# Patient Record
Sex: MALE | Race: WHITE | NOT HISPANIC OR LATINO | Employment: OTHER | URBAN - METROPOLITAN AREA
[De-identification: names, ages, dates, MRNs, and addresses within clinical notes are randomized per-mention and may not be internally consistent; named-entity substitution may affect disease eponyms.]

---

## 2019-08-23 PROBLEM — R91.8 PULMONARY NODULES: Status: ACTIVE | Noted: 2019-08-23

## 2019-08-23 PROBLEM — R07.9 CHEST PAIN: Status: ACTIVE | Noted: 2019-08-23

## 2019-08-23 PROBLEM — D72.829 LEUKOCYTOSIS: Status: ACTIVE | Noted: 2019-08-23

## 2019-08-23 PROBLEM — R07.2 PRECORDIAL PAIN: Status: ACTIVE | Noted: 2019-08-23

## 2019-08-23 PROBLEM — F17.200 TOBACCO USE DISORDER: Status: ACTIVE | Noted: 2019-08-23

## 2019-09-05 PROBLEM — I25.10 CORONARY ARTERY DISEASE DUE TO CALCIFIED CORONARY LESION: Status: ACTIVE | Noted: 2019-09-05

## 2019-09-05 PROBLEM — I25.84 CORONARY ARTERY DISEASE DUE TO CALCIFIED CORONARY LESION: Status: ACTIVE | Noted: 2019-09-05

## 2019-09-05 PROBLEM — N40.1 BENIGN PROSTATIC HYPERPLASIA WITH URINARY FREQUENCY: Status: ACTIVE | Noted: 2019-09-05

## 2019-09-05 PROBLEM — R53.83 OTHER FATIGUE: Status: ACTIVE | Noted: 2019-09-05

## 2019-09-05 PROBLEM — E29.1 MALE HYPOGONADISM: Status: ACTIVE | Noted: 2019-09-05

## 2019-09-05 PROBLEM — R35.0 BENIGN PROSTATIC HYPERPLASIA WITH URINARY FREQUENCY: Status: ACTIVE | Noted: 2019-09-05

## 2019-09-05 PROBLEM — M51.9 LUMBAR DISC DISEASE: Status: ACTIVE | Noted: 2019-09-05

## 2019-09-12 ENCOUNTER — TELEPHONE (OUTPATIENT)
Dept: CARDIOLOGY | Facility: CLINIC | Age: 69
End: 2019-09-12

## 2019-09-12 NOTE — TELEPHONE ENCOUNTER
Spoke to pt. Wife, informed her of appointment has already been made. Pt. Verbalized understanding

## 2019-09-12 NOTE — TELEPHONE ENCOUNTER
----- Message from Kenji Riley sent at 9/12/2019  8:59 AM CDT -----  Contact: Federica - Spouse  Type: Needs Medical Advice    Who Called:  Federica  Best Call Back Number: [phone]  Additional Information: Caller states the patient was supposed to have received a call to schedule a 2 week follow up for Monday 9/9. Caller and patient has not received said call. No available appointments found on Epic. Please call to advise. Thanks!

## 2019-09-12 NOTE — TELEPHONE ENCOUNTER
----- Message from Nilam Andre sent at 9/12/2019  4:53 PM CDT -----  Contact: wife Federica  Patient wife need to speak to nurse regarding scheduling appt for patient for 2 week hospital f/u    Epic earliest is 01/28    Please call to advice [phone] (home)

## 2019-10-01 ENCOUNTER — OFFICE VISIT (OUTPATIENT)
Dept: CARDIOLOGY | Facility: CLINIC | Age: 69
End: 2019-10-01
Payer: MEDICARE

## 2019-10-01 VITALS
HEART RATE: 69 BPM | BODY MASS INDEX: 19.32 KG/M2 | SYSTOLIC BLOOD PRESSURE: 111 MMHG | HEIGHT: 71 IN | DIASTOLIC BLOOD PRESSURE: 69 MMHG | WEIGHT: 138 LBS

## 2019-10-01 DIAGNOSIS — I25.84 CORONARY ARTERY DISEASE DUE TO CALCIFIED CORONARY LESION: ICD-10-CM

## 2019-10-01 DIAGNOSIS — F17.200 TOBACCO USE DISORDER: ICD-10-CM

## 2019-10-01 DIAGNOSIS — I25.10 CORONARY ARTERY DISEASE DUE TO CALCIFIED CORONARY LESION: ICD-10-CM

## 2019-10-01 PROCEDURE — 99214 OFFICE O/P EST MOD 30 MIN: CPT | Mod: S$PBB,,, | Performed by: PHYSICIAN ASSISTANT

## 2019-10-01 PROCEDURE — 99214 PR OFFICE/OUTPT VISIT, EST, LEVL IV, 30-39 MIN: ICD-10-PCS | Mod: S$PBB,,, | Performed by: PHYSICIAN ASSISTANT

## 2019-10-01 PROCEDURE — 99214 OFFICE O/P EST MOD 30 MIN: CPT | Mod: PBBFAC,PO | Performed by: PHYSICIAN ASSISTANT

## 2019-10-01 PROCEDURE — 99999 PR PBB SHADOW E&M-EST. PATIENT-LVL IV: ICD-10-PCS | Mod: PBBFAC,,, | Performed by: PHYSICIAN ASSISTANT

## 2019-10-01 PROCEDURE — 99999 PR PBB SHADOW E&M-EST. PATIENT-LVL IV: CPT | Mod: PBBFAC,,, | Performed by: PHYSICIAN ASSISTANT

## 2019-10-01 NOTE — PROGRESS NOTES
Subjective:    Patient ID:  Nas Jenkins is a 68 y.o. male who presents for follow-up of CAD.    HPI  Mr. Jenkins is a very pleasant gentleman who follows with Dr. Tate. He was hospitalized three times in August with generalized weakness. He had an extensive evaluation which included a nuclear stress test that was negative for ischemia. Because of his continue complaints of generalized weakness and fatigue he underwent coronary angiography which revealed a normal LM, significant disease in the LAD, mild disease in the LCx, and a  of the RCA. He underwent successful stenting of the LAD with LEXY. It was recommended, if he had continued symptoms, to return for PCI of the RCA.     Since returning home, he continues to have episodes of tremors with LE weakness. He is scheduled to follow up with Neurology with an MRI next week. He denies symptoms of CP or BARROSO. He has decreased exercise tolerance, but attributes this to debility as he has been unable to do much for a few months secondary to his tremors and LE weakness.     Review of Systems   Constitution: Positive for decreased appetite, malaise/fatigue and weight loss. Negative for chills, diaphoresis, fever and weight gain.   HENT: Negative for sore throat.    Eyes: Negative for blurred vision, vision loss in left eye, vision loss in right eye and visual disturbance.   Cardiovascular: Negative for chest pain, claudication, dyspnea on exertion, leg swelling, near-syncope, orthopnea, palpitations, paroxysmal nocturnal dyspnea and syncope.   Respiratory: Negative for cough, hemoptysis, shortness of breath, sputum production and wheezing.    Endocrine: Negative for cold intolerance and heat intolerance.   Hematologic/Lymphatic: Negative for adenopathy. Does not bruise/bleed easily.   Skin: Negative for rash.   Musculoskeletal: Positive for joint pain (right hip) and muscle weakness. Negative for falls and myalgias.   Gastrointestinal: Negative for abdominal pain, change  "in bowel habit, constipation, diarrhea, melena and nausea.   Genitourinary: Negative for bladder incontinence.   Neurological: Positive for numbness (R LE, now resolved), tremors and weakness. Negative for dizziness, focal weakness, headaches and light-headedness.   Psychiatric/Behavioral: Negative for altered mental status.         Vitals:    10/01/19 1409   BP: 111/69   BP Location: Left arm   Patient Position: Sitting   BP Method: Medium (Automatic)   Pulse: 69   Weight: 62.6 kg (138 lb 0.1 oz)   Height: 5' 11" (1.803 m)   Body mass index is 19.25 kg/m².     Objective:    Physical Exam   Constitutional: He is oriented to person, place, and time. He appears well-developed and well-nourished.   HENT:   Head: Normocephalic and atraumatic.   Eyes: Pupils are equal, round, and reactive to light. EOM are normal.   Neck: Neck supple. No JVD present. No tracheal deviation present. No thyromegaly present.   Cardiovascular: Normal rate, regular rhythm, S1 normal, S2 normal, intact distal pulses and normal pulses. PMI is not displaced. Exam reveals no gallop and no friction rub.   No murmur heard.  Pulmonary/Chest: Effort normal and breath sounds normal. No respiratory distress. He has no wheezes. He has no rales. He exhibits no tenderness.   Abdominal: Soft. Bowel sounds are normal. He exhibits no distension and no mass. There is no tenderness.   Musculoskeletal: Normal range of motion. He exhibits no edema or tenderness.   Neurological: He is alert and oriented to person, place, and time.   Skin: Skin is warm and dry. No rash noted.   Psychiatric: He has a normal mood and affect. His behavior is normal.         Assessment:         Coronary artery disease due to calcified coronary lesion  S/p LAD PCI on 8/25/19.  On ASA, Plavix, and statin.     Tobacco use disorder  Continues to smoke.     Plan:       Continue current cardiac medications.   Discussed diet and exercise.   F/U with Neurology as scheduled.   He is currently " asymptomatic with regards to his CAD. Will defer further intervention at this time. Patient will contact us if he develops symptoms.   F/U with Dr. Tate in 6 months.

## 2019-12-09 PROBLEM — I25.10 CORONARY ARTERY DISEASE INVOLVING NATIVE CORONARY ARTERY OF NATIVE HEART WITHOUT ANGINA PECTORIS: Status: ACTIVE | Noted: 2019-12-09

## 2019-12-09 PROBLEM — M48.02 SPINAL STENOSIS IN CERVICAL REGION: Status: ACTIVE | Noted: 2019-10-11

## 2019-12-09 PROBLEM — R25.1 TREMOR: Status: ACTIVE | Noted: 2019-10-11

## 2020-03-30 ENCOUNTER — PATIENT MESSAGE (OUTPATIENT)
Dept: CARDIOLOGY | Facility: CLINIC | Age: 70
End: 2020-03-30

## 2020-03-30 ENCOUNTER — TELEPHONE (OUTPATIENT)
Dept: CARDIOLOGY | Facility: CLINIC | Age: 70
End: 2020-03-30

## 2020-03-30 NOTE — TELEPHONE ENCOUNTER
Switched appt to virtual visit and sent pt portal message.     ----- Message from Sweetie Brown sent at 3/30/2020  1:23 PM CDT -----  Type: Needs Medical Advice    Who Called: Federica (wife)  Best Call Back Number: [phone]  Additional Information: patient portal has been set up. Requesting a call back to schedule virtual visit.

## 2020-04-06 PROBLEM — E78.5 DYSLIPIDEMIA: Status: ACTIVE | Noted: 2020-04-06

## 2020-04-06 PROBLEM — Z95.5 STENTED CORONARY ARTERY: Status: ACTIVE | Noted: 2020-04-06

## 2020-04-17 PROBLEM — I73.9 PAD (PERIPHERAL ARTERY DISEASE): Status: ACTIVE | Noted: 2020-04-17

## 2020-05-06 ENCOUNTER — TELEPHONE (OUTPATIENT)
Dept: CARDIOLOGY | Facility: CLINIC | Age: 70
End: 2020-05-06

## 2020-05-06 NOTE — TELEPHONE ENCOUNTER
----- Message from Nikky Egnland sent at 5/6/2020  1:34 PM CDT -----  Contact: Wife/Roxann  Type: Needs Medical Advice  Who Called:  Wife  Best Call Back Number: [phone]  Additional Information: Roxann states she has just taken COVID-19 test and will take some time to get results back.  Roxann would like patient to see in office but will call to  Update when she will be able to make apt.  Please call to advise. Thanks!

## 2020-05-12 ENCOUNTER — PATIENT MESSAGE (OUTPATIENT)
Dept: CARDIOLOGY | Facility: CLINIC | Age: 70
End: 2020-05-12

## 2020-07-13 ENCOUNTER — TELEPHONE (OUTPATIENT)
Dept: CARDIOLOGY | Facility: CLINIC | Age: 70
End: 2020-07-13

## 2020-07-13 NOTE — TELEPHONE ENCOUNTER
----- Message from Summer Braun sent at 7/13/2020 11:37 AM CDT -----  Pt called to get an appointment scheduled to get surgery clearance. Pt surgery is scheduled for 8/1/2020 clearance needs to be done before then.  please reach out to pt wife at [phone]

## 2020-07-15 ENCOUNTER — TELEPHONE (OUTPATIENT)
Dept: CARDIOLOGY | Facility: CLINIC | Age: 70
End: 2020-07-15

## 2020-07-15 NOTE — TELEPHONE ENCOUNTER
Pt is scheduled for left fem pop with vein harvest on 8/10 under general anesthesia. Pt is on plavix and asa. Please advise.

## 2020-07-16 ENCOUNTER — OFFICE VISIT (OUTPATIENT)
Dept: CARDIOLOGY | Facility: CLINIC | Age: 70
End: 2020-07-16
Payer: MEDICARE

## 2020-07-16 VITALS
BODY MASS INDEX: 20.51 KG/M2 | SYSTOLIC BLOOD PRESSURE: 113 MMHG | HEART RATE: 80 BPM | DIASTOLIC BLOOD PRESSURE: 77 MMHG | WEIGHT: 151.44 LBS | HEIGHT: 72 IN

## 2020-07-16 DIAGNOSIS — E78.5 DYSLIPIDEMIA: ICD-10-CM

## 2020-07-16 DIAGNOSIS — I73.9 PAD (PERIPHERAL ARTERY DISEASE): ICD-10-CM

## 2020-07-16 DIAGNOSIS — Z95.5 STENTED CORONARY ARTERY: ICD-10-CM

## 2020-07-16 DIAGNOSIS — I25.10 CORONARY ARTERY DISEASE INVOLVING NATIVE CORONARY ARTERY OF NATIVE HEART WITHOUT ANGINA PECTORIS: Primary | ICD-10-CM

## 2020-07-16 PROCEDURE — 99214 PR OFFICE/OUTPT VISIT, EST, LEVL IV, 30-39 MIN: ICD-10-PCS | Mod: S$GLB,,, | Performed by: INTERNAL MEDICINE

## 2020-07-16 PROCEDURE — 99999 PR PBB SHADOW E&M-EST. PATIENT-LVL IV: ICD-10-PCS | Mod: PBBFAC,,, | Performed by: INTERNAL MEDICINE

## 2020-07-16 PROCEDURE — 99214 OFFICE O/P EST MOD 30 MIN: CPT | Mod: S$GLB,,, | Performed by: INTERNAL MEDICINE

## 2020-07-16 PROCEDURE — 99214 OFFICE O/P EST MOD 30 MIN: CPT | Mod: PBBFAC,PO | Performed by: INTERNAL MEDICINE

## 2020-07-16 PROCEDURE — 99999 PR PBB SHADOW E&M-EST. PATIENT-LVL IV: CPT | Mod: PBBFAC,,, | Performed by: INTERNAL MEDICINE

## 2020-07-16 NOTE — PROGRESS NOTES
Subjective:    Patient ID:  Nas Jenkins is a 69 y.o. male who presents for follow-up of Cardiac Clearance      HPI  Here for follow up of CAD-PCI (8/19 ).  Has left CFA stenosis scheduled for surgery Dr santos. Patients states is doing well no chest pain, SOB or change in exertional tolerence.         Review of Systems   Constitution: Negative for malaise/fatigue.   Eyes: Negative for blurred vision.   Cardiovascular: Negative for chest pain, claudication, cyanosis, dyspnea on exertion, irregular heartbeat, leg swelling, near-syncope, orthopnea, palpitations, paroxysmal nocturnal dyspnea and syncope.   Respiratory: Negative for cough and shortness of breath.    Hematologic/Lymphatic: Does not bruise/bleed easily.   Musculoskeletal: Negative for back pain, falls, joint pain, muscle cramps, muscle weakness and myalgias.   Gastrointestinal: Negative for abdominal pain, change in bowel habit, nausea and vomiting.   Genitourinary: Negative for urgency.   Neurological: Negative for dizziness, focal weakness and light-headedness.       Past Medical History:   Diagnosis Date    BPH (benign prostatic hyperplasia)     Carpal tunnel syndrome     Coronary artery disease     Fibromyalgia     Fractures     GERD (gastroesophageal reflux disease)     Hyperlipidemia     Lumbar disc disease     Male hypogonadism     PAD (peripheral artery disease)     PTSD (post-traumatic stress disorder)      Patient Active Problem List   Diagnosis    Precordial pain    Tobacco use disorder    Pulmonary nodules    Leukocytosis    Dyspnea    Other fatigue    Coronary artery disease due to calcified coronary lesion    Lumbar disc disease    Male hypogonadism    Benign prostatic hyperplasia with urinary frequency    Spinal stenosis in cervical region    Tremor    Coronary artery disease involving native coronary artery of native heart without angina pectoris    Stented coronary artery    Dyslipidemia    PAD (peripheral  artery disease)        Objective:     Vitals:    07/16/20 1450   BP: 113/77   Pulse: 80        Physical Exam   Constitutional: He is oriented to person, place, and time. He appears well-developed and well-nourished.   HENT:   Head: Normocephalic and atraumatic.   Eyes: Pupils are equal, round, and reactive to light. EOM are normal.   Neck: Neck supple. No JVD present. No tracheal deviation present. No thyromegaly present.   Cardiovascular: Normal rate, regular rhythm, S1 normal, S2 normal, intact distal pulses and normal pulses. PMI is not displaced. Exam reveals no gallop and no friction rub.   No murmur heard.  Pulmonary/Chest: Effort normal and breath sounds normal. No respiratory distress. He has no wheezes. He has no rales. He exhibits no tenderness.   Abdominal: Soft. Bowel sounds are normal. He exhibits no distension and no mass. There is no abdominal tenderness.   Musculoskeletal: Normal range of motion.         General: No tenderness or edema.   Neurological: He is alert and oriented to person, place, and time.   Skin: Skin is warm and dry. No rash noted.   Psychiatric: He has a normal mood and affect. His behavior is normal.             ..    Chemistry        Component Value Date/Time     06/17/2020 1445    K 4.1 06/17/2020 1445     06/17/2020 1445    CO2 30 06/17/2020 1445    BUN 13 06/17/2020 1445    CREATININE 1.00 06/17/2020 1445    GLU 94 06/17/2020 1445        Component Value Date/Time    CALCIUM 9.2 06/17/2020 1445    ALKPHOS 85 06/17/2020 1445    AST 37 06/17/2020 1445    ALT 27 06/17/2020 1445    BILITOT 0.3 06/17/2020 1445    ESTGFRAFRICA >60 06/17/2020 1445    EGFRNONAA >60 06/17/2020 1445            ..  Lab Results   Component Value Date    CHOL 162 06/17/2020    CHOL 141 12/09/2019     Lab Results   Component Value Date    HDL 47 06/17/2020    HDL 51 12/09/2019     Lab Results   Component Value Date    LDLCALC 52.8 (L) 06/17/2020    LDLCALC 49.8 (L) 12/09/2019     Lab Results    Component Value Date    TRIG 311 (H) 06/17/2020    TRIG 201 (H) 12/09/2019     Lab Results   Component Value Date    CHOLHDL 29.0 06/17/2020    CHOLHDL 36.2 12/09/2019     ..  Lab Results   Component Value Date    WBC 8.68 06/17/2020    HGB 14.0 06/17/2020    HCT 44.8 06/17/2020    MCV 99 (H) 06/17/2020     06/17/2020       Test(s) Reviewed  I have reviewed the following in detail:  [] Stress test   [] Angiography   [x] Echocardiogram   [x] Labs   [x] Other:       Assessment:         ICD-10-CM ICD-9-CM   1. Coronary artery disease involving native coronary artery of native heart without angina pectoris  I25.10 414.01   2. Stented coronary artery  Z95.5 V45.82   3. Dyslipidemia  E78.5 272.4   4. PAD (peripheral artery disease)  I73.9 443.9     Problem List Items Addressed This Visit     Coronary artery disease involving native coronary artery of native heart without angina pectoris - Primary    Overview     8/19  Left main- normal in appearance for patient's age.  Left anterior descending-70% lesion seen is midportion mid LAD.  There are 2 medium-sized diagonal diffuse mild disease all less than 25%.    Circumflex- all less than 15 at 20%.  There is 3 obtuse marginals all with mild disease all less than 25%.  Right coronary artery-chronic total occlusion in its proximal portion.  The distal right coronary is a normal size vessel seen filling via collaterals from the distal circumflex and septal perforators from the LAD.     Fractional flow reserve of the LAD was 0.79.  Successful stenting of the LAD using a Synergy 3.0 x 28 post dilated to 3.0    I          Stented coronary artery    Overview     8/19   LAD using a Synergy 3.0 x 28 post dilated to 3.0             Dyslipidemia    PAD (peripheral artery disease)           Plan:           Return to clinic 9 months   Low level/low impact aerobic exercise 5x's/wk. Heart healthy diet and risk factor modification.    See labs and med orders.  Patient is a low CV  risk for a low risk surgery. Continue BP/antiplatelet meds ángela-op.  Start fish oil repeat trig if still elevated start fibrates.    Portions of this note may have been created with voice recognition software.  Grammatical, syntax and spelling errors may be inevitable.

## 2020-07-24 ENCOUNTER — TELEPHONE (OUTPATIENT)
Dept: CARDIOLOGY | Facility: CLINIC | Age: 70
End: 2020-07-24

## 2020-07-24 NOTE — TELEPHONE ENCOUNTER
----- Message from Marlys Lugo sent at 7/24/2020  2:51 PM CDT -----  Regarding: for f/u appt after surgery  Contact: Federica wife  Type: Needs Medical Advice  Who Called:  Ignacia whatley  Best Call Back Number: [phone]  Additional Information: Pls call wife regarding post op visit plus she has several questions to ask

## 2020-11-13 PROBLEM — H25.9 AGE-RELATED CATARACT: Status: ACTIVE | Noted: 2020-11-13

## 2020-11-13 PROBLEM — D50.9 IRON DEFICIENCY ANEMIA: Status: ACTIVE | Noted: 2020-11-13

## 2020-11-13 PROBLEM — J34.2 DEVIATED NASAL SEPTUM: Status: ACTIVE | Noted: 2020-11-13

## 2020-11-13 PROBLEM — K29.90 GASTRODUODENITIS: Status: ACTIVE | Noted: 2020-11-13

## 2020-11-13 PROBLEM — G56.00 CARPAL TUNNEL SYNDROME: Status: ACTIVE | Noted: 2020-11-13

## 2020-11-13 PROBLEM — F45.42 OTHER PAIN DISORDERS RELATED TO PSYCHOLOGICAL FACTORS: Status: ACTIVE | Noted: 2020-11-13

## 2020-11-13 PROBLEM — F43.21 ADJUSTMENT DISORDER WITH DEPRESSED MOOD: Status: ACTIVE | Noted: 2020-11-13

## 2020-11-13 PROBLEM — H90.3 BILATERAL SENSORINEURAL HEARING LOSS: Status: ACTIVE | Noted: 2020-11-13

## 2020-11-13 PROBLEM — E78.1 PURE HYPERTRIGLYCERIDEMIA: Status: ACTIVE | Noted: 2020-11-13

## 2020-11-13 PROBLEM — M79.7 RHEUMATISM AND FIBROSITIS: Status: ACTIVE | Noted: 2020-11-13

## 2020-11-13 PROBLEM — M79.0 RHEUMATISM AND FIBROSITIS: Status: ACTIVE | Noted: 2020-11-13

## 2020-11-13 PROBLEM — K21.9 GASTROESOPHAGEAL REFLUX DISEASE WITHOUT ESOPHAGITIS: Status: ACTIVE | Noted: 2020-11-13

## 2021-07-01 PROBLEM — K31.84 GASTROPARESIS: Status: ACTIVE | Noted: 2021-07-01

## 2021-07-01 PROBLEM — M54.9 MID BACK PAIN: Status: ACTIVE | Noted: 2021-07-01

## 2022-01-13 ENCOUNTER — TELEPHONE (OUTPATIENT)
Dept: CARDIOLOGY | Facility: CLINIC | Age: 72
End: 2022-01-13
Payer: OTHER GOVERNMENT

## 2022-01-13 NOTE — TELEPHONE ENCOUNTER
----- Message from Waqar Varma LPN sent at 1/13/2022  4:59 PM CST -----  Contact: pt wife    ----- Message -----  From: Raquel Guerra  Sent: 1/13/2022   4:59 PM CST  To: Lea FARLEY Staff    Type:  RX Refill Request    Who Called:  pt wife  Refill or New Rx:  refill  RX Name and Strength:  HYDROcodone-acetaminophen (NORCO) 7.5-325 mg per tablet  How is the patient currently taking it? (ex. 1XDay):  as directed  Is this a 30 day or 90 day RX:  30  Preferred Pharmacy with phone number:      [facility]/pharmacy #[id number] [address] [address]  Phone: [phone] Fax: [phone]        Local or Mail Order:  local  Ordering Provider:  historical dr vitaliy Zapien Call Back Number:  [phone]  Additional Information:  pt is completely out of medication.  Currently searching for new PCP and dr pizano is only provider at [facility] at the moment.  Wife requests that dr consider refilling RX this one month until she is able to est care with another PCP.  Thank you~

## 2022-01-13 NOTE — TELEPHONE ENCOUNTER
Advised pt's wife that we do not fill this medication in cardiology. Advised to call to schedule appt with primary care asap.

## 2022-01-19 ENCOUNTER — OFFICE VISIT (OUTPATIENT)
Dept: FAMILY MEDICINE | Facility: CLINIC | Age: 72
End: 2022-01-19
Payer: MEDICARE

## 2022-01-19 VITALS
HEART RATE: 80 BPM | OXYGEN SATURATION: 97 % | TEMPERATURE: 98 F | WEIGHT: 158.75 LBS | BODY MASS INDEX: 21.5 KG/M2 | DIASTOLIC BLOOD PRESSURE: 64 MMHG | SYSTOLIC BLOOD PRESSURE: 118 MMHG | HEIGHT: 72 IN

## 2022-01-19 DIAGNOSIS — I25.10 CORONARY ARTERY DISEASE DUE TO CALCIFIED CORONARY LESION: ICD-10-CM

## 2022-01-19 DIAGNOSIS — Z01.818 PRE-OP EVALUATION: Primary | ICD-10-CM

## 2022-01-19 DIAGNOSIS — K40.90 RIGHT INGUINAL HERNIA: ICD-10-CM

## 2022-01-19 DIAGNOSIS — M51.9 LUMBAR DISC DISEASE: ICD-10-CM

## 2022-01-19 DIAGNOSIS — I73.9 PAD (PERIPHERAL ARTERY DISEASE): ICD-10-CM

## 2022-01-19 DIAGNOSIS — I25.84 CORONARY ARTERY DISEASE DUE TO CALCIFIED CORONARY LESION: ICD-10-CM

## 2022-01-19 DIAGNOSIS — F43.21 ADJUSTMENT DISORDER WITH DEPRESSED MOOD: ICD-10-CM

## 2022-01-19 DIAGNOSIS — F17.200 TOBACCO USE DISORDER: ICD-10-CM

## 2022-01-19 PROCEDURE — 99999 PR PBB SHADOW E&M-EST. PATIENT-LVL V: CPT | Mod: PBBFAC,,, | Performed by: FAMILY MEDICINE

## 2022-01-19 PROCEDURE — 1159F MED LIST DOCD IN RCRD: CPT | Mod: CPTII,S$GLB,, | Performed by: FAMILY MEDICINE

## 2022-01-19 PROCEDURE — 99999 PR PBB SHADOW E&M-EST. PATIENT-LVL V: ICD-10-PCS | Mod: PBBFAC,,, | Performed by: FAMILY MEDICINE

## 2022-01-19 PROCEDURE — 99204 OFFICE O/P NEW MOD 45 MIN: CPT | Mod: S$GLB,,, | Performed by: FAMILY MEDICINE

## 2022-01-19 PROCEDURE — 99204 PR OFFICE/OUTPT VISIT, NEW, LEVL IV, 45-59 MIN: ICD-10-PCS | Mod: S$GLB,,, | Performed by: FAMILY MEDICINE

## 2022-01-19 PROCEDURE — 1125F PR PAIN SEVERITY QUANTIFIED, PAIN PRESENT: ICD-10-PCS | Mod: CPTII,S$GLB,, | Performed by: FAMILY MEDICINE

## 2022-01-19 PROCEDURE — 3078F DIAST BP <80 MM HG: CPT | Mod: CPTII,S$GLB,, | Performed by: FAMILY MEDICINE

## 2022-01-19 PROCEDURE — 3074F PR MOST RECENT SYSTOLIC BLOOD PRESSURE < 130 MM HG: ICD-10-PCS | Mod: CPTII,S$GLB,, | Performed by: FAMILY MEDICINE

## 2022-01-19 PROCEDURE — 3008F BODY MASS INDEX DOCD: CPT | Mod: CPTII,S$GLB,, | Performed by: FAMILY MEDICINE

## 2022-01-19 PROCEDURE — 1160F PR REVIEW ALL MEDS BY PRESCRIBER/CLIN PHARMACIST DOCUMENTED: ICD-10-PCS | Mod: CPTII,S$GLB,, | Performed by: FAMILY MEDICINE

## 2022-01-19 PROCEDURE — 3008F PR BODY MASS INDEX (BMI) DOCUMENTED: ICD-10-PCS | Mod: CPTII,S$GLB,, | Performed by: FAMILY MEDICINE

## 2022-01-19 PROCEDURE — 3078F PR MOST RECENT DIASTOLIC BLOOD PRESSURE < 80 MM HG: ICD-10-PCS | Mod: CPTII,S$GLB,, | Performed by: FAMILY MEDICINE

## 2022-01-19 PROCEDURE — 1125F AMNT PAIN NOTED PAIN PRSNT: CPT | Mod: CPTII,S$GLB,, | Performed by: FAMILY MEDICINE

## 2022-01-19 PROCEDURE — 1160F RVW MEDS BY RX/DR IN RCRD: CPT | Mod: CPTII,S$GLB,, | Performed by: FAMILY MEDICINE

## 2022-01-19 PROCEDURE — 1159F PR MEDICATION LIST DOCUMENTED IN MEDICAL RECORD: ICD-10-PCS | Mod: CPTII,S$GLB,, | Performed by: FAMILY MEDICINE

## 2022-01-19 PROCEDURE — 3074F SYST BP LT 130 MM HG: CPT | Mod: CPTII,S$GLB,, | Performed by: FAMILY MEDICINE

## 2022-01-19 RX ORDER — HYDROCODONE BITARTRATE AND ACETAMINOPHEN 7.5; 325 MG/1; MG/1
1 TABLET ORAL EVERY 8 HOURS PRN
Qty: 30 TABLET | Refills: 0 | Status: SHIPPED | OUTPATIENT
Start: 2022-01-19 | End: 2022-01-28 | Stop reason: SDUPTHER

## 2022-01-19 NOTE — PROGRESS NOTES
Subjective:       Patient ID: Nas Jenkins is a 71 y.o. male.    Chief Complaint: Establish Care and Medication Refill    Here today to establish care with a new PCP.  He was seeing Dr. Bro Rdz who recently retired. [facility]   He has a PMH of chronic low back pain, fibromyalgia, GERD, hyperlipidemia, CAD, PTSD, and PAD.  He has been getting Norco 10 #120 per month from his PCP.  He is requesting a refill on the medication.  checked, medication last filled on 12/13/21.    CAD:  He is seeing Dr. Tate (Cardiology), he has stents in his coronary arteries.  He is on ASA, Plavix and Crestor.  Inguinal Hernia:  Recently saw general surgeon (Kofi) who plans to do surgery once he gets clearance.  He has an appt with his Cardiologist later this month.  He last had lab work in July.    Medication Refill  Pertinent negatives include no abdominal pain, chest pain, coughing, fever, headaches, nausea, vomiting or weakness.     Review of Systems   Constitutional: Negative for appetite change and fever.   Respiratory: Negative for cough, shortness of breath and wheezing.    Cardiovascular: Negative for chest pain and palpitations.   Gastrointestinal: Negative for abdominal pain, constipation, diarrhea, nausea and vomiting.   Genitourinary: Negative for difficulty urinating, dysuria, frequency and hematuria.   Neurological: Negative for dizziness, syncope, weakness and headaches.   Psychiatric/Behavioral: Negative for agitation, behavioral problems and confusion. The patient is not nervous/anxious.        Objective:      Vitals:    01/19/22 1015   BP: 118/64   BP Location: Right arm   Patient Position: Sitting   BP Method: Large (Manual)   Pulse: 80   Temp: 98.3 °F (36.8 °C)   SpO2: 97%   Weight: 72 kg (158 lb 11.7 oz)   Height: 6' (1.829 m)      Physical Exam    Assessment:       1. Pre-op evaluation    2. Right inguinal hernia    3. Coronary artery disease due to calcified coronary lesion    4.  PAD (peripheral artery disease)    5. Lumbar disc disease    6. Adjustment disorder with depressed mood    7. Tobacco use disorder        Plan:       Pre-op evaluation  -     CBC Auto Differential; Future; Expected date: 01/19/2022  -     Comprehensive Metabolic Panel; Future; Expected date: 01/19/2022    Right inguinal hernia    Coronary artery disease due to calcified coronary lesion    PAD (peripheral artery disease)    Lumbar disc disease  -     Ambulatory referral/consult to Pain Clinic; Future; Expected date: 01/26/2022  -     HYDROcodone-acetaminophen (NORCO) 7.5-325 mg per tablet; Take 1 tablet by mouth every 8 (eight) hours as needed for Pain. Greater than seven days therapy is medically necessary  Dispense: 30 tablet; Refill: 0    Adjustment disorder with depressed mood  -     Ambulatory referral/consult to Psychiatry; Future; Expected date: 01/26/2022    Tobacco use disorder    I explained to patient that I do not write chronic narcotics or chronic benzos.  I am willing to refer him to pain mgt and psych at this time.  I will fill a small amount of the medication as he has been out for a couple days and has gone through withdrawals in the past.  Otherwise, pending normal lab work, he is clear for surgery from my perspective.      Medication List with Changes/Refills   Current Medications    ALBUTEROL (VENTOLIN HFA) 90 MCG/ACTUATION INHALER    Inhale 2 puffs into the lungs every 4 (four) hours as needed for Wheezing. Rescue    ASPIRIN (ECOTRIN) 81 MG EC TABLET    Take 81 mg by mouth once daily. Only takes every now and then    AZELASTINE (ASTELIN) 137 MCG (0.1 %) NASAL SPRAY    SPRAY 1 SPRAY INTO EACH NOSTRIL TWICE DAILY    CETIRIZINE (ZYRTEC) 10 MG TABLET    Take 10 mg by mouth once daily.    CLONAZEPAM (KLONOPIN) 1 MG TABLET    Take 1 tablet (1 mg total) by mouth 2 (two) times daily as needed for Anxiety.    CLOPIDOGREL (PLAVIX) 75 MG TABLET    Take 1 tablet (75 mg total) by mouth once daily.    COENZYME  Q10 200 MG CAPSULE    Take 200 mg by mouth once daily.    DULOXETINE (CYMBALTA) 60 MG CAPSULE    Take 1 capsule (60 mg total) by mouth 2 (two) times daily.    EYEBRIGHT ORAL    Take 1 capsule by mouth once daily.    FERROUS SULFATE (FEOSOL) 325 MG (65 MG IRON) TAB TABLET    Take 325 mg by mouth once daily.    FISH OIL-OMEGA-3 FATTY ACIDS 300-1,000 MG CAPSULE    Take 1 capsule by mouth once daily.    LACTOBACILLUS RHAMNOSUS GG (CULTURELLE) 10 BILLION CELL CAPSULE    Take 1 capsule by mouth once daily.    MAG CARB/ALUMINUM HYDROX/ALGIN (GAVISCON EXTRA STRENGTH ORAL)    Take 1 tablet by mouth 4 (four) times daily as needed (for heartburn).    METOCLOPRAMIDE HCL (REGLAN) 5 MG TABLET    Take 1 tablet (5 mg total) by mouth 3 (three) times daily before meals.    MULTIVITAMIN WITH MINERALS (NENITA MULTIPLE/CHELATED MINERAL) TABLET    Take 1 tablet by mouth once daily.     NITROGLYCERIN (NITROSTAT) 0.4 MG SL TABLET    Place 1 tablet (0.4 mg total) under the tongue every 5 (five) minutes as needed for Chest pain.    PROMETHAZINE (PHENERGAN) 25 MG TABLET    Take 25 mg by mouth every 6 (six) hours as needed.     ROSUVASTATIN (CRESTOR) 40 MG TAB    TAKE 1 TABLET BY MOUTH EVERY DAY    SILDENAFIL (VIAGRA) 100 MG TABLET    Take 1 tablet (100 mg total) by mouth daily as needed for Erectile Dysfunction.    TAMSULOSIN (FLOMAX) 0.4 MG CAP    Take 1 capsule (0.4 mg total) by mouth once daily.    TESTOSTERONE (ANDROGEL) 1.62 % (20.25 MG/1.25 GRAM) GLPK    Place 2.5 g onto the skin once daily.    TURMERIC ORAL    Take 1 capsule by mouth once daily.    VITAMIN D (VITAMIN D3) 1000 UNITS TAB    Take 1,000 Units by mouth once daily.   Changed and/or Refilled Medications    Modified Medication Previous Medication    HYDROCODONE-ACETAMINOPHEN (NORCO) 7.5-325 MG PER TABLET HYDROcodone-acetaminophen (NORCO) 7.5-325 mg per tablet       Take 1 tablet by mouth every 8 (eight) hours as needed for Pain. Greater than seven days therapy is medically  necessary    Take 1 tablet by mouth every 8 (eight) hours as needed for Pain. Greater than seven days therapy is medically necessary   Discontinued Medications    FAMOTIDINE (PEPCID) 40 MG TABLET    Take 1 tablet (40 mg total) by mouth 2 (two) times daily.    IPRATROPIUM (ATROVENT) 0.03 % NASAL SPRAY    2 sprays by Nasal route 2 (two) times daily.    PREDNISONE (DELTASONE) 10 MG TABLET    Take 1 tablet (10 mg total) by mouth once daily.

## 2022-01-25 ENCOUNTER — OFFICE VISIT (OUTPATIENT)
Dept: CARDIOLOGY | Facility: CLINIC | Age: 72
End: 2022-01-25
Payer: OTHER GOVERNMENT

## 2022-01-25 VITALS
DIASTOLIC BLOOD PRESSURE: 79 MMHG | SYSTOLIC BLOOD PRESSURE: 138 MMHG | HEIGHT: 72 IN | WEIGHT: 160.5 LBS | BODY MASS INDEX: 21.74 KG/M2 | HEART RATE: 67 BPM

## 2022-01-25 DIAGNOSIS — I73.9 PAD (PERIPHERAL ARTERY DISEASE): ICD-10-CM

## 2022-01-25 DIAGNOSIS — R06.09 DYSPNEA ON EXERTION: ICD-10-CM

## 2022-01-25 DIAGNOSIS — I25.10 CORONARY ARTERY DISEASE INVOLVING NATIVE CORONARY ARTERY OF NATIVE HEART WITHOUT ANGINA PECTORIS: Primary | ICD-10-CM

## 2022-01-25 DIAGNOSIS — I25.10 CORONARY ARTERY DISEASE DUE TO CALCIFIED CORONARY LESION: ICD-10-CM

## 2022-01-25 DIAGNOSIS — E78.5 DYSLIPIDEMIA: ICD-10-CM

## 2022-01-25 DIAGNOSIS — I25.84 CORONARY ARTERY DISEASE DUE TO CALCIFIED CORONARY LESION: ICD-10-CM

## 2022-01-25 DIAGNOSIS — Z95.5 STENTED CORONARY ARTERY: ICD-10-CM

## 2022-01-25 DIAGNOSIS — E78.1 PURE HYPERTRIGLYCERIDEMIA: ICD-10-CM

## 2022-01-25 PROCEDURE — 99214 PR OFFICE/OUTPT VISIT, EST, LEVL IV, 30-39 MIN: ICD-10-PCS | Mod: S$PBB,,, | Performed by: INTERNAL MEDICINE

## 2022-01-25 PROCEDURE — 99214 OFFICE O/P EST MOD 30 MIN: CPT | Mod: S$PBB,,, | Performed by: INTERNAL MEDICINE

## 2022-01-25 PROCEDURE — 99999 PR PBB SHADOW E&M-EST. PATIENT-LVL III: ICD-10-PCS | Mod: PBBFAC,,, | Performed by: INTERNAL MEDICINE

## 2022-01-25 PROCEDURE — 99213 OFFICE O/P EST LOW 20 MIN: CPT | Mod: PBBFAC,PO | Performed by: INTERNAL MEDICINE

## 2022-01-25 PROCEDURE — 99999 PR PBB SHADOW E&M-EST. PATIENT-LVL III: CPT | Mod: PBBFAC,,, | Performed by: INTERNAL MEDICINE

## 2022-01-28 ENCOUNTER — OFFICE VISIT (OUTPATIENT)
Dept: FAMILY MEDICINE | Facility: CLINIC | Age: 72
End: 2022-01-28
Payer: MEDICARE

## 2022-01-28 ENCOUNTER — TELEPHONE (OUTPATIENT)
Dept: FAMILY MEDICINE | Facility: CLINIC | Age: 72
End: 2022-01-28
Payer: OTHER GOVERNMENT

## 2022-01-28 VITALS
HEIGHT: 72 IN | DIASTOLIC BLOOD PRESSURE: 70 MMHG | SYSTOLIC BLOOD PRESSURE: 110 MMHG | BODY MASS INDEX: 21.66 KG/M2 | TEMPERATURE: 99 F | HEART RATE: 68 BPM | WEIGHT: 159.94 LBS

## 2022-01-28 DIAGNOSIS — E29.1 MALE HYPOGONADISM: ICD-10-CM

## 2022-01-28 DIAGNOSIS — Z12.11 SCREENING FOR MALIGNANT NEOPLASM OF COLON: ICD-10-CM

## 2022-01-28 DIAGNOSIS — F41.9 ANXIETY: ICD-10-CM

## 2022-01-28 DIAGNOSIS — K46.9 ABDOMINAL HERNIA WITHOUT OBSTRUCTION AND WITHOUT GANGRENE, RECURRENCE NOT SPECIFIED, UNSPECIFIED HERNIA TYPE: ICD-10-CM

## 2022-01-28 DIAGNOSIS — Z12.5 ENCOUNTER FOR SCREENING FOR MALIGNANT NEOPLASM OF PROSTATE: ICD-10-CM

## 2022-01-28 DIAGNOSIS — N40.0 BENIGN PROSTATIC HYPERPLASIA WITHOUT LOWER URINARY TRACT SYMPTOMS: ICD-10-CM

## 2022-01-28 DIAGNOSIS — M51.9 LUMBAR DISC DISEASE: Primary | ICD-10-CM

## 2022-01-28 PROCEDURE — 1125F PR PAIN SEVERITY QUANTIFIED, PAIN PRESENT: ICD-10-PCS | Mod: CPTII,S$GLB,, | Performed by: PHYSICIAN ASSISTANT

## 2022-01-28 PROCEDURE — 1159F PR MEDICATION LIST DOCUMENTED IN MEDICAL RECORD: ICD-10-PCS | Mod: CPTII,S$GLB,, | Performed by: PHYSICIAN ASSISTANT

## 2022-01-28 PROCEDURE — 3074F SYST BP LT 130 MM HG: CPT | Mod: CPTII,S$GLB,, | Performed by: PHYSICIAN ASSISTANT

## 2022-01-28 PROCEDURE — 3288F FALL RISK ASSESSMENT DOCD: CPT | Mod: CPTII,S$GLB,, | Performed by: PHYSICIAN ASSISTANT

## 2022-01-28 PROCEDURE — 99214 PR OFFICE/OUTPT VISIT, EST, LEVL IV, 30-39 MIN: ICD-10-PCS | Mod: S$GLB,,, | Performed by: PHYSICIAN ASSISTANT

## 2022-01-28 PROCEDURE — 3078F DIAST BP <80 MM HG: CPT | Mod: CPTII,S$GLB,, | Performed by: PHYSICIAN ASSISTANT

## 2022-01-28 PROCEDURE — 3074F PR MOST RECENT SYSTOLIC BLOOD PRESSURE < 130 MM HG: ICD-10-PCS | Mod: CPTII,S$GLB,, | Performed by: PHYSICIAN ASSISTANT

## 2022-01-28 PROCEDURE — 3008F PR BODY MASS INDEX (BMI) DOCUMENTED: ICD-10-PCS | Mod: CPTII,S$GLB,, | Performed by: PHYSICIAN ASSISTANT

## 2022-01-28 PROCEDURE — 1101F PR PT FALLS ASSESS DOC 0-1 FALLS W/OUT INJ PAST YR: ICD-10-PCS | Mod: CPTII,S$GLB,, | Performed by: PHYSICIAN ASSISTANT

## 2022-01-28 PROCEDURE — 1160F RVW MEDS BY RX/DR IN RCRD: CPT | Mod: CPTII,S$GLB,, | Performed by: PHYSICIAN ASSISTANT

## 2022-01-28 PROCEDURE — 1159F MED LIST DOCD IN RCRD: CPT | Mod: CPTII,S$GLB,, | Performed by: PHYSICIAN ASSISTANT

## 2022-01-28 PROCEDURE — 99214 OFFICE O/P EST MOD 30 MIN: CPT | Mod: S$GLB,,, | Performed by: PHYSICIAN ASSISTANT

## 2022-01-28 PROCEDURE — 1101F PT FALLS ASSESS-DOCD LE1/YR: CPT | Mod: CPTII,S$GLB,, | Performed by: PHYSICIAN ASSISTANT

## 2022-01-28 PROCEDURE — 1160F PR REVIEW ALL MEDS BY PRESCRIBER/CLIN PHARMACIST DOCUMENTED: ICD-10-PCS | Mod: CPTII,S$GLB,, | Performed by: PHYSICIAN ASSISTANT

## 2022-01-28 PROCEDURE — 1125F AMNT PAIN NOTED PAIN PRSNT: CPT | Mod: CPTII,S$GLB,, | Performed by: PHYSICIAN ASSISTANT

## 2022-01-28 PROCEDURE — 3288F PR FALLS RISK ASSESSMENT DOCUMENTED: ICD-10-PCS | Mod: CPTII,S$GLB,, | Performed by: PHYSICIAN ASSISTANT

## 2022-01-28 PROCEDURE — 3078F PR MOST RECENT DIASTOLIC BLOOD PRESSURE < 80 MM HG: ICD-10-PCS | Mod: CPTII,S$GLB,, | Performed by: PHYSICIAN ASSISTANT

## 2022-01-28 PROCEDURE — 3008F BODY MASS INDEX DOCD: CPT | Mod: CPTII,S$GLB,, | Performed by: PHYSICIAN ASSISTANT

## 2022-01-28 RX ORDER — HYDROCODONE BITARTRATE AND ACETAMINOPHEN 7.5; 325 MG/1; MG/1
1 TABLET ORAL EVERY 8 HOURS PRN
Qty: 60 TABLET | Refills: 0 | Status: SHIPPED | OUTPATIENT
Start: 2022-01-28 | End: 2022-02-24

## 2022-01-28 RX ORDER — TAMSULOSIN HYDROCHLORIDE 0.4 MG/1
1 CAPSULE ORAL DAILY
Qty: 90 CAPSULE | Refills: 3 | Status: ON HOLD | OUTPATIENT
Start: 2022-01-28

## 2022-01-28 RX ORDER — CLONAZEPAM 1 MG/1
1 TABLET ORAL 2 TIMES DAILY PRN
Qty: 60 TABLET | Refills: 0 | Status: ON HOLD | OUTPATIENT
Start: 2022-01-28

## 2022-01-28 NOTE — PROGRESS NOTES
Subjective:       Patient ID: Nas Jenkins is a 71 y.o. male.    Chief Complaint: Establish Care and Medication Refill    Patient is a 70 yo male coming in today for med refill. 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 He Takes norco daily for this.       Low-back Pain  This is a chronic problem. The current episode started more than 1 year ago. The problem occurs daily. The problem has been waxing and waning. Pertinent negatives include no abdominal pain or chest pain. Nothing aggravates the symptoms. He has tried oral narcotics for the symptoms. The treatment provided moderate relief.   Anxiety  Presents for initial visit. Onset was at an unknown time. The problem has been unchanged. Symptoms include excessive worry, insomnia, irritability, muscle tension and restlessness. Patient reports no chest pain or shortness of breath. Symptoms occur constantly. The severity of symptoms is causing significant distress. The quality of sleep is fair.     His past medical history is significant for anxiety/panic attacks. Past treatments include benzodiazephines. Compliance with prior treatments has been good.     Past Medical History:   Diagnosis Date    BPH (benign prostatic hyperplasia)     Carpal tunnel syndrome     Coronary artery disease     Fibromyalgia     Fractures     GERD (gastroesophageal reflux disease)     Hyperlipidemia     Lumbar disc disease     Male hypogonadism     PAD (peripheral artery disease)     PTSD (post-traumatic stress disorder)        Review of Systems   Constitutional: Positive for irritability. Negative for activity change and appetite change.   Respiratory: Negative for chest tightness and shortness of breath.    Cardiovascular: Negative for chest pain.   Gastrointestinal: Negative for abdominal pain.   Psychiatric/Behavioral: The patient has insomnia.          Objective:      Physical Exam  Constitutional:       General: He is not in acute  distress.     Appearance: Normal appearance. He is not ill-appearing or toxic-appearing.   HENT:      Mouth/Throat:      Mouth: Mucous membranes are dry.      Pharynx: Posterior oropharyngeal erythema present. No oropharyngeal exudate.   Cardiovascular:      Rate and Rhythm: Normal rate and regular rhythm.      Pulses: Normal pulses.      Heart sounds: Normal heart sounds. No murmur heard.  No friction rub. No gallop.    Pulmonary:      Effort: Pulmonary effort is normal. No respiratory distress.      Breath sounds: Normal breath sounds. No stridor. No wheezing, rhonchi or rales.   Chest:      Chest wall: No tenderness.   Abdominal:      General: Abdomen is flat. Bowel sounds are normal.      Tenderness: There is abdominal tenderness.      Hernia: A hernia is present. Hernia is present in the right inguinal area.          Comments: Right direct inguinal hernia. He is scheduled for surgery soon   Neurological:      Mental Status: He is alert.         Assessment:       Problem List Items Addressed This Visit     Male hypogonadism    Relevant Orders    Estrogens, Total    Testosterone Panel    Lumbar disc disease - Primary    Relevant Medications    HYDROcodone-acetaminophen (NORCO) 7.5-325 mg per tablet      Other Visit Diagnoses     Anxiety        Relevant Medications    clonazePAM (KLONOPIN) 1 MG tablet    Benign prostatic hyperplasia without lower urinary tract symptoms        Relevant Medications    tamsulosin (FLOMAX) 0.4 mg Cap    Screening for malignant neoplasm of colon        Relevant Orders    PSA, Screening    Abdominal hernia without obstruction and without gangrene, recurrence not specified, unspecified hernia type        Encounter for screening for malignant neoplasm of prostate         Relevant Orders    PSA, Screening          Plan:       Lumbar disc disease  -     HYDROcodone-acetaminophen (NORCO) 7.5-325 mg per tablet; Take 1 tablet by mouth every 8 (eight) hours as needed for Pain. Greater than  seven days therapy is medically necessary  Dispense: 60 tablet; Refill: 0    Anxiety  -     clonazePAM (KLONOPIN) 1 MG tablet; Take 1 tablet (1 mg total) by mouth 2 (two) times daily as needed for Anxiety.  Dispense: 60 tablet; Refill: 0    Male hypogonadism  -     Estrogens, Total; Future; Expected date: 01/28/2022  -     Testosterone Panel; Future; Expected date: 01/28/2022    Benign prostatic hyperplasia without lower urinary tract symptoms  -     tamsulosin (FLOMAX) 0.4 mg Cap; Take 1 capsule (0.4 mg total) by mouth once daily.  Dispense: 90 capsule; Refill: 3    Screening for malignant neoplasm of colon  -     PSA, Screening; Future; Expected date: 01/28/2022    Abdominal hernia without obstruction and without gangrene, recurrence not specified, unspecified hernia type    Encounter for screening for malignant neoplasm of prostate   -     PSA, Screening; Future; Expected date: 01/28/2022

## 2022-01-28 NOTE — TELEPHONE ENCOUNTER
----- Message from Martha Marti sent at 1/28/2022  3:10 PM CST -----  Contact: pt  Type: Needs Medical Advice    Who: Called: pt     Best Call Back Number: [phone]    Inquiry/Question: pt is on the way but is running a few minutes late please advise  Thank you~

## 2022-01-31 ENCOUNTER — TELEPHONE (OUTPATIENT)
Dept: CARDIOLOGY | Facility: CLINIC | Age: 72
End: 2022-01-31
Payer: OTHER GOVERNMENT

## 2022-02-09 PROBLEM — K40.20 NON-RECURRENT BILATERAL INGUINAL HERNIA WITHOUT OBSTRUCTION OR GANGRENE: Status: ACTIVE | Noted: 2022-02-09

## 2022-04-12 ENCOUNTER — OFFICE VISIT (OUTPATIENT)
Dept: GASTROENTEROLOGY | Facility: CLINIC | Age: 72
End: 2022-04-12
Payer: OTHER GOVERNMENT

## 2022-04-12 ENCOUNTER — LAB VISIT (OUTPATIENT)
Dept: LAB | Facility: HOSPITAL | Age: 72
End: 2022-04-12
Attending: INTERNAL MEDICINE
Payer: OTHER GOVERNMENT

## 2022-04-12 VITALS — WEIGHT: 158.94 LBS | HEIGHT: 72 IN | BODY MASS INDEX: 21.53 KG/M2 | RESPIRATION RATE: 18 BRPM

## 2022-04-12 DIAGNOSIS — R10.84 GENERALIZED ABDOMINAL PAIN: ICD-10-CM

## 2022-04-12 DIAGNOSIS — K59.00 CONSTIPATION, UNSPECIFIED CONSTIPATION TYPE: ICD-10-CM

## 2022-04-12 DIAGNOSIS — K31.84 GASTROPARESIS: ICD-10-CM

## 2022-04-12 DIAGNOSIS — R63.4 WEIGHT LOSS: Primary | ICD-10-CM

## 2022-04-12 LAB
CREAT SERPL-MCNC: 1.1 MG/DL (ref 0.5–1.4)
EST. GFR  (AFRICAN AMERICAN): >60 ML/MIN/1.73 M^2
EST. GFR  (NON AFRICAN AMERICAN): >60 ML/MIN/1.73 M^2

## 2022-04-12 PROCEDURE — 99203 PR OFFICE/OUTPT VISIT, NEW, LEVL III, 30-44 MIN: ICD-10-PCS | Mod: S$PBB,,, | Performed by: INTERNAL MEDICINE

## 2022-04-12 PROCEDURE — 99999 PR PBB SHADOW E&M-EST. PATIENT-LVL IV: ICD-10-PCS | Mod: PBBFAC,,, | Performed by: INTERNAL MEDICINE

## 2022-04-12 PROCEDURE — 36415 COLL VENOUS BLD VENIPUNCTURE: CPT | Mod: PO | Performed by: INTERNAL MEDICINE

## 2022-04-12 PROCEDURE — 99214 OFFICE O/P EST MOD 30 MIN: CPT | Mod: PBBFAC,PO | Performed by: INTERNAL MEDICINE

## 2022-04-12 PROCEDURE — 99999 PR PBB SHADOW E&M-EST. PATIENT-LVL IV: CPT | Mod: PBBFAC,,, | Performed by: INTERNAL MEDICINE

## 2022-04-12 PROCEDURE — 99203 OFFICE O/P NEW LOW 30 MIN: CPT | Mod: S$PBB,,, | Performed by: INTERNAL MEDICINE

## 2022-04-12 PROCEDURE — 82565 ASSAY OF CREATININE: CPT | Performed by: INTERNAL MEDICINE

## 2022-04-12 RX ORDER — HYDROCODONE BITARTRATE AND ACETAMINOPHEN 7.5; 325 MG/1; MG/1
1 TABLET ORAL EVERY 8 HOURS PRN
COMMUNITY
End: 2022-09-02

## 2022-04-12 NOTE — PROGRESS NOTES
[facility]. Gastric empty study from 2018 positive delay in emptying. Treated reglan with no benefit. Positive post-prandial bloating and nausea. Rare vomiting. No bleeding. No pain. No diarrhea. Positive chronic constipation. Positive weight loss. Reportedly had EGD and C-scope years ago. Last CT scan 2019 unremarkable. No fever or jaundice.     REVIEW OF SYSTEMS:   Constitutional: Negative for fever, appetite change; Positive unexpected weight loss.  HENT: Negative for sore throat and trouble swallowing.  Eyes: Negative for visual disturbance.  Respiratory: Negative for chest tightness, shortness of breath and wheezing.  Cardiovascular: Negative for chest pain.  Gastrointestinal:  as per HPI  Genitourinary: Negative for dysuria, frequency and hematuria.  Musculoskeletal: Negative for myalgias, joint swelling and arthralgias.  Skin: Negative for color change and rash.   Neurological: Negative for syncope, weakness and headaches.  Psychiatric/Behavioral: Negative for confusion.    PHYSICAL EXAMINATION:                                                        GENERAL:  Comfortable, in no acute distress.      SKIN: Non-jaundiced.                             HEENT EXAM:  Nonicteric.  No adenopathy.  Oropharynx is clear.               NECK:  Supple.                                                               LUNGS:  Clear.                                                               CARDIAC:  Regular rate and rhythm.  S1, S2.  No murmur.                      ABDOMEN:  Soft, positive bowel sounds, nontender.  No hepatosplenomegaly or masses.  No rebound or guarding.                                             EXTREMITIES:  No edema.     NEURO: CN II-XII intact; motor/sensory non-focal.    IMP: 1. Gastroparesis          2. Weight loss          3. Chronic constipation    REC: 1. Abd CT scan            2. Recommendation re: EGD/C-scope following above.

## 2022-05-02 ENCOUNTER — HOSPITAL ENCOUNTER (OUTPATIENT)
Dept: RADIOLOGY | Facility: HOSPITAL | Age: 72
Discharge: HOME | End: 2022-05-02
Attending: INTERNAL MEDICINE
Payer: OTHER GOVERNMENT

## 2022-05-02 DIAGNOSIS — R10.84 GENERALIZED ABDOMINAL PAIN: ICD-10-CM

## 2022-05-02 PROCEDURE — 74177 CT ABD & PELVIS W/CONTRAST: CPT | Mod: 26,,, | Performed by: RADIOLOGY

## 2022-05-02 PROCEDURE — 25500020 PHARM REV CODE 255: Mod: PO | Performed by: INTERNAL MEDICINE

## 2022-05-02 PROCEDURE — 74177 CT ABDOMEN PELVIS WITH CONTRAST: ICD-10-PCS | Mod: 26,,, | Performed by: RADIOLOGY

## 2022-05-02 PROCEDURE — A9698 NON-RAD CONTRAST MATERIALNOC: HCPCS | Mod: PO | Performed by: INTERNAL MEDICINE

## 2022-05-02 PROCEDURE — 74177 CT ABD & PELVIS W/CONTRAST: CPT | Mod: TC,PO

## 2022-05-02 RX ADMIN — IOHEXOL 75 ML: 350 INJECTION, SOLUTION INTRAVENOUS at 02:05

## 2022-05-02 RX ADMIN — IOHEXOL 1000 ML: 9 SOLUTION ORAL at 02:05

## 2022-05-03 ENCOUNTER — TELEPHONE (OUTPATIENT)
Dept: GASTROENTEROLOGY | Facility: CLINIC | Age: 72
End: 2022-05-03
Payer: OTHER GOVERNMENT

## 2022-05-03 NOTE — TELEPHONE ENCOUNTER
----- Message from EllaWolfpack Chassis sent at 5/3/2022  2:12 PM CDT -----  Regarding: results  Type: Needs Medical Advice    Who Called:      Best Call Back Number: [phone]  Additional Information: Requesting a call back from Nurse, regarding pt needs to discuss results and does not understand them ,please advise and call back

## 2022-05-04 ENCOUNTER — TELEPHONE (OUTPATIENT)
Dept: GASTROENTEROLOGY | Facility: CLINIC | Age: 72
End: 2022-05-04
Payer: OTHER GOVERNMENT

## 2022-05-04 DIAGNOSIS — N40.0 ENLARGED PROSTATE: Primary | ICD-10-CM

## 2022-05-04 DIAGNOSIS — R16.0 LIVER MASS: ICD-10-CM

## 2022-05-04 NOTE — TELEPHONE ENCOUNTER
----- Message from Marlys Butt sent at 5/4/2022  9:27 AM CDT -----  Contact: Federica  Type: Needs Medical Advice    Who Called: Federica, pt wife  Best Call Back Number: [phone]  Additional  Information: Pt wife would like for the nurse  Fariba, to give her a call  Please Advise- Thank you

## 2022-05-04 NOTE — TELEPHONE ENCOUNTER
----- Message from Tita Beard sent at 5/4/2022  1:38 PM CDT -----  Contact: WifeIgnacia  Type: Needs Medical Advice    Who Called:  Wife    Best Call Back Number: [phone]    Additional Information: Please call back regarding [facility] approval for tests.  Thanks.

## 2022-05-04 NOTE — TELEPHONE ENCOUNTER
Wife wanted to give an alternate number as Kimberly with the [facility] is out of the office.       RAUDEL Albarran: [phone]      Left message for Kimberly to call back

## 2022-05-04 NOTE — TELEPHONE ENCOUNTER
Spoke with pt's wife. Informed of results & recommendations per Dr. Dimas. Pt's wife verbalized understanding. Wife did not want to schedule anything until approved by the [facility]. Wife gave number to Kimberly- her contact at the [facility]: [phone].

## 2022-05-04 NOTE — TELEPHONE ENCOUNTER
Spoke with pt's wife. Wife spoke with [facility] & Avis with the [facility] informed wife that as long as [facility] approved pt to see Dr. Dimas, everything Dr. Dimas orders is approved. Wife scheduled everything & verbalized understanding to all dates & times. Prep instructions & reminder letter placed in mail & sent to [facility].       Pt will need something to help with MRI as he is claustrophobic.

## 2022-05-04 NOTE — TELEPHONE ENCOUNTER
----- Message from Ariel Dimas MD sent at 5/4/2022  8:28 AM CDT -----  Tell pt CT notable for non-specific liver lesions, enlarged prostate, constipation. Needs liver mass protocol MRI for further evaluation; Urology evaluation for enlarged prostate; EGD/C-scope for weight loss.

## 2022-05-05 NOTE — TELEPHONE ENCOUNTER
Spoke with pt's wife. Wife state pt is having pain all over with shakes. Wife informed to reach out to PCP with the [facility] as GI does not prescribe pain medicine. Wife verbalized understanding.

## 2022-05-05 NOTE — TELEPHONE ENCOUNTER
----- Message from Makeda Uribe MA sent at 5/5/2022 12:40 PM CDT -----  Type: Needs Medical Advice  Who Called:  Ignacia Zapien Call Back Number: [phone]  Additional Information: patient's wife is calling to request pain medication for her spouse.  She states he is in tremendous pain.  Please call to discuss

## 2022-05-09 ENCOUNTER — PATIENT MESSAGE (OUTPATIENT)
Dept: SMOKING CESSATION | Facility: CLINIC | Age: 72
End: 2022-05-09
Payer: OTHER GOVERNMENT

## 2022-05-12 ENCOUNTER — TELEPHONE (OUTPATIENT)
Dept: GASTROENTEROLOGY | Facility: CLINIC | Age: 72
End: 2022-05-12
Payer: OTHER GOVERNMENT

## 2022-05-12 NOTE — TELEPHONE ENCOUNTER
----- Message from Ruba Nichols sent at 5/12/2022  3:01 PM CDT -----  Regarding: advice  Contact: patient  Type: Needs Medical Advice  Who Called:  wife, Roxann Jenkins  Symptoms (please be specific):    How long has patient had these symptoms:    Pharmacy name and phone #:    [id number] [address] [address]  Phone: [phone] Fax: [phone]  [facility] Call Back Number: [phone] (home)   Additional Information: Patient is having a MRI on 05/16/22 and is claustrophobic. Roxann sent a previous message regarding getting something to clam him down so he can take the MRI. The pharmacy has not received a prescription. Please call patient's wife  to advise.Thanks!

## 2022-05-16 ENCOUNTER — TELEPHONE (OUTPATIENT)
Dept: GASTROENTEROLOGY | Facility: CLINIC | Age: 72
End: 2022-05-16
Payer: OTHER GOVERNMENT

## 2022-05-16 ENCOUNTER — HOSPITAL ENCOUNTER (OUTPATIENT)
Dept: RADIOLOGY | Facility: HOSPITAL | Age: 72
Discharge: HOME | End: 2022-05-16
Attending: INTERNAL MEDICINE
Payer: OTHER GOVERNMENT

## 2022-05-16 ENCOUNTER — OFFICE VISIT (OUTPATIENT)
Dept: UROLOGY | Facility: CLINIC | Age: 72
End: 2022-05-16
Payer: OTHER GOVERNMENT

## 2022-05-16 DIAGNOSIS — N40.0 BENIGN PROSTATIC HYPERPLASIA, UNSPECIFIED WHETHER LOWER URINARY TRACT SYMPTOMS PRESENT: Primary | ICD-10-CM

## 2022-05-16 DIAGNOSIS — N40.0 ENLARGED PROSTATE: ICD-10-CM

## 2022-05-16 DIAGNOSIS — R16.0 LIVER MASS: ICD-10-CM

## 2022-05-16 PROCEDURE — 99499 UNLISTED E&M SERVICE: CPT | Mod: S$PBB,,,

## 2022-05-16 PROCEDURE — 99499 NO LOS: ICD-10-PCS | Mod: S$PBB,,,

## 2022-05-16 RX ORDER — PHENYLEPHRINE HYDROCHLORIDE 25 MG/ML
SOLUTION/ DROPS OPHTHALMIC
COMMUNITY
Start: 2022-04-22 | End: 2022-08-24

## 2022-05-16 RX ORDER — CYCLOPENTOLATE HYDROCHLORIDE 10 MG/ML
SOLUTION/ DROPS OPHTHALMIC
COMMUNITY
Start: 2022-04-22 | End: 2022-08-24

## 2022-05-16 RX ORDER — FLURBIPROFEN SODIUM 0.3 MG/ML
SOLUTION/ DROPS OPHTHALMIC
COMMUNITY
Start: 2022-04-22 | End: 2022-08-24

## 2022-05-16 RX ORDER — PROPARACAINE HYDROCHLORIDE 5 MG/ML
SOLUTION/ DROPS OPHTHALMIC
COMMUNITY
Start: 2022-04-22 | End: 2022-08-24

## 2022-05-16 RX ORDER — TROPICAMIDE 10 MG/ML
SOLUTION/ DROPS OPHTHALMIC
COMMUNITY
Start: 2022-04-22 | End: 2022-08-24

## 2022-05-16 RX ORDER — PHENYLEPHRINE HYDROCHLORIDE 100 MG/ML
SOLUTION/ DROPS OPHTHALMIC
COMMUNITY
Start: 2022-04-22 | End: 2022-08-24

## 2022-05-16 NOTE — TELEPHONE ENCOUNTER
----- Message from Breonna Oden sent at 5/16/2022  7:02 AM CDT -----  Type: Needs Medical Advice  Who Called:  PT wife  Symptoms (please be specific):  Pt is needing the procedure today [facility] the [facility] and not [employer]    Best Call Back Number: [phone]  Additional Information: Please call and carrie

## 2022-05-16 NOTE — TELEPHONE ENCOUNTER
Spoke with pt's wife. Informed to have pt take Klonopin 30 minutes prior to MRI. Also, when checking in for appointment, to make sure to check in using [facility], not [facility]. Wife verbalized understanding to all.

## 2022-05-17 ENCOUNTER — HOSPITAL ENCOUNTER (OUTPATIENT)
Dept: RADIOLOGY | Facility: HOSPITAL | Age: 72
Discharge: HOME | End: 2022-05-17
Attending: INTERNAL MEDICINE
Payer: MEDICARE

## 2022-05-17 ENCOUNTER — TELEPHONE (OUTPATIENT)
Dept: UROLOGY | Facility: CLINIC | Age: 72
End: 2022-05-17
Payer: OTHER GOVERNMENT

## 2022-05-17 PROCEDURE — 25500020 PHARM REV CODE 255: Mod: PO | Performed by: INTERNAL MEDICINE

## 2022-05-17 PROCEDURE — 74183 MRI ABD W/O CNTR FLWD CNTR: CPT | Mod: 26,,, | Performed by: RADIOLOGY

## 2022-05-17 PROCEDURE — 74183 MRI ABDOMEN W WO CONTRAST: ICD-10-PCS | Mod: 26,,, | Performed by: RADIOLOGY

## 2022-05-17 PROCEDURE — A9585 GADOBUTROL INJECTION: HCPCS | Mod: PO | Performed by: INTERNAL MEDICINE

## 2022-05-17 PROCEDURE — 74183 MRI ABD W/O CNTR FLWD CNTR: CPT | Mod: TC,PO

## 2022-05-17 RX ORDER — GADOBUTROL 604.72 MG/ML
7 INJECTION INTRAVENOUS
Status: COMPLETED | OUTPATIENT
Start: 2022-05-17 | End: 2022-05-17

## 2022-05-17 RX ADMIN — GADOBUTROL 7 ML: 604.72 INJECTION INTRAVENOUS at 01:05

## 2022-05-17 NOTE — TELEPHONE ENCOUNTER
----- Message from Amanda Díaz sent at 5/17/2022  9:05 AM CDT -----  Contact: Wife Ignacia  Patient had an appt yesterday that had to be cancelled due to pt having an extreme panic attack, the MRI was rescheduled for today but the Appointment with Arabella Hawkins NP was not. Can we please call patients wife back to have this appt rescheduled at [phone] (home) . Thank You.

## 2022-05-23 ENCOUNTER — OFFICE VISIT (OUTPATIENT)
Dept: UROLOGY | Facility: CLINIC | Age: 72
End: 2022-05-23
Payer: MEDICARE

## 2022-05-23 DIAGNOSIS — Z12.5 SCREENING FOR PROSTATE CANCER: Primary | ICD-10-CM

## 2022-05-23 DIAGNOSIS — N13.8 ENLARGED PROSTATE WITH URINARY OBSTRUCTION: ICD-10-CM

## 2022-05-23 DIAGNOSIS — N40.1 ENLARGED PROSTATE WITH URINARY OBSTRUCTION: ICD-10-CM

## 2022-05-23 PROCEDURE — 99999 PR PBB SHADOW E&M-EST. PATIENT-LVL IV: ICD-10-PCS | Mod: PBBFAC,,, | Performed by: UROLOGY

## 2022-05-23 PROCEDURE — 99999 PR PBB SHADOW E&M-EST. PATIENT-LVL IV: CPT | Mod: PBBFAC,,, | Performed by: UROLOGY

## 2022-05-23 PROCEDURE — 99213 OFFICE O/P EST LOW 20 MIN: CPT | Mod: S$GLB,,, | Performed by: UROLOGY

## 2022-05-23 PROCEDURE — 99214 OFFICE O/P EST MOD 30 MIN: CPT | Mod: PBBFAC,PO | Performed by: UROLOGY

## 2022-05-23 PROCEDURE — 99213 PR OFFICE/OUTPT VISIT, EST, LEVL III, 20-29 MIN: ICD-10-PCS | Mod: S$GLB,,, | Performed by: UROLOGY

## 2022-05-23 NOTE — PROGRESS NOTES
Subjective:       Patient ID: Nas Jenkins is a 71 y.o. male.    Chief Complaint: Other (Abnormal CT)    HPI     71-year-old here to review results of recent CT scan.  CT scan noted in large prostate as well as an adrenal nodule.  Follow-up MRI did not demonstrate and adrenal mass.  He does have a history of BPH.  He has been taking Flomax for years.  He has moderate obstructive symptoms.  He denies hematuria and dysuria.  Primarily complaint is pain.  His pain is not localized.  He has a consultation with Pain Management.      Current Outpatient Medications:     albuterol (VENTOLIN HFA) 90 mcg/actuation inhaler, Inhale 2 puffs into the lungs every 4 (four) hours as needed for Wheezing. Rescue, Disp: 54 g, Rfl: 3    aspirin (ECOTRIN) 81 MG EC tablet, Take 1 tablet (81 mg total) by mouth once daily. Only takes every now and then, Disp: , Rfl: 0    azelastine (ASTELIN) 137 mcg (0.1 %) nasal spray, SPRAY 1 SPRAY INTO EACH NOSTRIL TWICE DAILY, Disp: 30 mL, Rfl: 1    cetirizine (ZYRTEC) 10 MG tablet, Take 10 mg by mouth once daily., Disp: , Rfl:     clonazePAM (KLONOPIN) 1 MG tablet, Take 1 tablet (1 mg total) by mouth 2 (two) times daily as needed for Anxiety., Disp: 60 tablet, Rfl: 0    clopidogreL (PLAVIX) 75 mg tablet, Take 1 tablet (75 mg total) by mouth once daily., Disp: 90 tablet, Rfl: 3    coenzyme Q10 200 mg capsule, Take 200 mg by mouth once daily., Disp: , Rfl:     cyclopentolate 1% (CYCLOGYL) 1 % ophthalmic solution, INSTILL 1 DROP IN SURGICAL EYE PRIOR TO PROCEDURE EVERY 5 MINUTES TIMES 3 (START ASOU), Disp: , Rfl:     EYEBRIGHT ORAL, Take 1 capsule by mouth once daily., Disp: , Rfl:     ferrous sulfate (FEOSOL) 325 mg (65 mg iron) Tab tablet, Take 325 mg by mouth once daily., Disp: , Rfl:     fish oil-omega-3 fatty acids 300-1,000 mg capsule, Take 1 capsule by mouth once daily., Disp: , Rfl:     flurbiprofen (OCUFEN) 0.03 % ophthalmic solution, INSTILL 1 DROP IN SURGICAL EYE PRIOR TO PROCEDURE  EVERY 30 MINUTES TIMES 4. BEGIN 2 HRS BEFORE SURGERY IN ASOU, Disp: , Rfl:     HYDROcodone-acetaminophen (NORCO) 7.5-325 mg per tablet, Take 1 tablet by mouth every 8 (eight) hours as needed for Pain., Disp: , Rfl:     Lactobacillus rhamnosus GG (CULTURELLE) 10 billion cell capsule, Take 1 capsule by mouth once daily., Disp: , Rfl:     mag carb/aluminum hydrox/algin (GAVISCON EXTRA STRENGTH ORAL), Take 1 tablet by mouth 4 (four) times daily as needed (for heartburn)., Disp: , Rfl:     metoclopramide HCl (REGLAN) 5 MG tablet, Take 1 tablet (5 mg total) by mouth 3 (three) times daily before meals., Disp: 90 tablet, Rfl: 5    multivitamin with minerals (NENITA MULTIPLE/CHELATED MINERAL) tablet, Take 1 tablet by mouth once daily. , Disp: , Rfl:     phenylephrine HCL 10% (LESTER-SYNEPHRINE) 10 % ophthalmic solution, INSTILL 1 DROP IN SURGICAL EYE PRIOR TO PROCEDURE EVERY 5 MINUTES TIMES 3, FOR ADDITIONAL DILATION AS NEEDED, IF PATIENT'S  SYSTOLIC BP IS <170 AND DIASTOLIC BP IS <100. EVERY 5 MINUTES TIMES 3, FOR ADDITIONAL DILATION AS NEEDED, IF PATIENT'S  SYSTOLIC BP IS <170 AND DIASTOLIC BP IS <100., Disp: , Rfl:     phenylephrine HCL 2.5% (MYDFRIN) 2.5 % ophthalmic solution, INSTILL 1 DROP IN SURGICAL EYE PRIOR TO PROCEDURE EVERY 5 MINUTES X 3 (START ASOU), Disp: , Rfl:     promethazine (PHENERGAN) 25 MG tablet, Take 25 mg by mouth every 6 (six) hours as needed. , Disp: , Rfl:     proparacaine (ALCAINE) 0.5 % ophthalmic solution, INSTILL 1 DROP IN SURGICAL EYE PRIOR TO PROCEDURE PRIOR TO EACH ADMINISTRATION OF DILATING EYE DROPS, Disp: , Rfl:     rosuvastatin (CRESTOR) 40 MG Tab, TAKE 1 TABLET BY MOUTH EVERY DAY, Disp: 90 tablet, Rfl: 1    tamsulosin (FLOMAX) 0.4 mg Cap, Take 1 capsule (0.4 mg total) by mouth once daily., Disp: 90 capsule, Rfl: 3    testosterone (ANDROGEL) 1.62 % (20.25 mg/1.25 gram) GlPk, Place 2.5 g onto the skin once daily., Disp: 180 packet, Rfl: 1    tropicamide 1% (MYDRIACYL) 1 % Drop,  INSTILL 1 DROP IN SURGICAL EYE PRIOR TO PROCEDURE EVERY 5 MINUTES TIMES 3 (START ASOU), Disp: , Rfl:     TURMERIC ORAL, Take 1 capsule by mouth once daily., Disp: , Rfl:     vitamin D (VITAMIN D3) 1000 units Tab, Take 1,000 Units by mouth once daily., Disp: , Rfl:     DULoxetine (CYMBALTA) 60 MG capsule, Take 1 capsule (60 mg total) by mouth 2 (two) times daily., Disp: 180 capsule, Rfl: 3    nitroGLYCERIN (NITROSTAT) 0.4 MG SL tablet, Place 1 tablet (0.4 mg total) under the tongue every 5 (five) minutes as needed for Chest pain., Disp: 90 tablet, Rfl: 3    sildenafiL (VIAGRA) 100 MG tablet, Take 1 tablet (100 mg total) by mouth daily as needed for Erectile Dysfunction., Disp: 27 tablet, Rfl: 2      Review of Systems   Constitutional: Negative for fever.   Eyes: Negative for visual disturbance.   Respiratory: Negative for shortness of breath.    Cardiovascular: Negative for chest pain.   Gastrointestinal: Negative for nausea.   Genitourinary: Positive for frequency. Negative for dysuria and hematuria.   Musculoskeletal: Positive for back pain. Negative for gait problem.   Skin: Negative for rash.   Neurological: Negative for seizures.   Psychiatric/Behavioral: Negative for confusion.       Objective:      Physical Exam  Vitals reviewed.   Constitutional:       Appearance: He is well-developed.   HENT:      Head: Normocephalic and atraumatic.   Eyes:      Conjunctiva/sclera: Conjunctivae normal.   Cardiovascular:      Rate and Rhythm: Normal rate.   Pulmonary:      Effort: Pulmonary effort is normal.   Genitourinary:     Prostate: Enlarged (50g, s/s/a). Not tender.      Rectum: No mass. Normal anal tone.   Musculoskeletal:         General: Normal range of motion.   Skin:     General: Skin is warm and dry.      Findings: No rash.   Neurological:      Mental Status: He is alert and oriented to person, place, and time.         Assessment:       1. Screening for prostate cancer    2. Enlarged prostate with urinary  obstruction        Plan:       Screening for prostate cancer    Enlarged prostate with urinary obstruction      exam is benign.  He is not interested in any surgical treatment for BPH.  Continue Flomax.  Follow up p.r.n.

## 2022-05-31 ENCOUNTER — TELEPHONE (OUTPATIENT)
Dept: GASTROENTEROLOGY | Facility: CLINIC | Age: 72
End: 2022-05-31
Payer: OTHER GOVERNMENT

## 2022-05-31 NOTE — TELEPHONE ENCOUNTER
Spoke with pt's wife. Informed [facility]. Wife verbalized understanding

## 2022-05-31 NOTE — TELEPHONE ENCOUNTER
----- Message from Luann Vazquez, Patient Care Assistant sent at 5/31/2022  9:43 AM CDT -----  Contact: Pt Wife  Type: Needs Medical Advice    Who Called: Pt Wife  Best Call Back Number: [phone]  Inquiry/Question: Pt wife is calling to speak to the nurse in regards to having his [facility] the [facility]. Please call back and advise Thank you~

## 2022-06-01 ENCOUNTER — TELEPHONE (OUTPATIENT)
Dept: ENDOSCOPY | Facility: HOSPITAL | Age: 72
End: 2022-06-01
Payer: OTHER GOVERNMENT

## 2022-06-01 NOTE — TELEPHONE ENCOUNTER
Spoke to pt's wife for pre-op call. Says her instructions state to hold aspirin on the day of procedure. She now has questions regarding pt holding plavix prior to procedure. Can you please reach out to advise? Thank you.

## 2022-06-07 ENCOUNTER — HOSPITAL ENCOUNTER (OUTPATIENT)
Facility: HOSPITAL | Age: 72
Discharge: HOME | End: 2022-06-07
Attending: INTERNAL MEDICINE | Admitting: INTERNAL MEDICINE
Payer: OTHER GOVERNMENT

## 2022-06-07 ENCOUNTER — ANESTHESIA EVENT (OUTPATIENT)
Dept: ENDOSCOPY | Facility: HOSPITAL | Age: 72
End: 2022-06-07
Payer: OTHER GOVERNMENT

## 2022-06-07 ENCOUNTER — ANESTHESIA (OUTPATIENT)
Dept: ENDOSCOPY | Facility: HOSPITAL | Age: 72
End: 2022-06-07
Payer: MEDICARE

## 2022-06-07 VITALS
HEART RATE: 52 BPM | OXYGEN SATURATION: 96 % | RESPIRATION RATE: 12 BRPM | DIASTOLIC BLOOD PRESSURE: 63 MMHG | HEIGHT: 72 IN | BODY MASS INDEX: 21.4 KG/M2 | TEMPERATURE: 98 F | SYSTOLIC BLOOD PRESSURE: 145 MMHG | WEIGHT: 158 LBS

## 2022-06-07 DIAGNOSIS — R63.4 WEIGHT LOSS: ICD-10-CM

## 2022-06-07 PROCEDURE — 88342 CHG IMMUNOCYTOCHEMISTRY: ICD-10-PCS | Mod: 26,,, | Performed by: PATHOLOGY

## 2022-06-07 PROCEDURE — 37000009 HC ANESTHESIA EA ADD 15 MINS: Mod: PO | Performed by: INTERNAL MEDICINE

## 2022-06-07 PROCEDURE — 63600175 PHARM REV CODE 636 W HCPCS: Mod: PO | Performed by: INTERNAL MEDICINE

## 2022-06-07 PROCEDURE — D9220A PRA ANESTHESIA: ICD-10-PCS | Mod: CRNA,,, | Performed by: NURSE ANESTHETIST, CERTIFIED REGISTERED

## 2022-06-07 PROCEDURE — 43239 EGD BIOPSY SINGLE/MULTIPLE: CPT | Mod: PO | Performed by: INTERNAL MEDICINE

## 2022-06-07 PROCEDURE — 00813 ANES UPR LWR GI NDSC PX: CPT | Mod: PO | Performed by: INTERNAL MEDICINE

## 2022-06-07 PROCEDURE — 88305 TISSUE EXAM BY PATHOLOGIST: ICD-10-PCS | Mod: 26,,, | Performed by: PATHOLOGY

## 2022-06-07 PROCEDURE — 45380 COLONOSCOPY AND BIOPSY: CPT | Mod: PO | Performed by: INTERNAL MEDICINE

## 2022-06-07 PROCEDURE — 45380 COLONOSCOPY AND BIOPSY: CPT | Mod: ,,, | Performed by: INTERNAL MEDICINE

## 2022-06-07 PROCEDURE — 88342 IMHCHEM/IMCYTCHM 1ST ANTB: CPT | Performed by: PATHOLOGY

## 2022-06-07 PROCEDURE — 88342 IMHCHEM/IMCYTCHM 1ST ANTB: CPT | Mod: 26,,, | Performed by: PATHOLOGY

## 2022-06-07 PROCEDURE — 37000008 HC ANESTHESIA 1ST 15 MINUTES: Mod: PO | Performed by: INTERNAL MEDICINE

## 2022-06-07 PROCEDURE — D9220A PRA ANESTHESIA: ICD-10-PCS | Mod: ANES,,, | Performed by: ANESTHESIOLOGY

## 2022-06-07 PROCEDURE — 43239 PR EGD, FLEX, W/BIOPSY, SGL/MULTI: ICD-10-PCS | Mod: ,,, | Performed by: INTERNAL MEDICINE

## 2022-06-07 PROCEDURE — 27201012 HC FORCEPS, HOT/COLD, DISP: Mod: PO | Performed by: INTERNAL MEDICINE

## 2022-06-07 PROCEDURE — 88305 TISSUE EXAM BY PATHOLOGIST: CPT | Performed by: PATHOLOGY

## 2022-06-07 PROCEDURE — 45380 PR COLONOSCOPY,BIOPSY: ICD-10-PCS | Mod: ,,, | Performed by: INTERNAL MEDICINE

## 2022-06-07 PROCEDURE — 25000003 PHARM REV CODE 250: Mod: PO | Performed by: NURSE ANESTHETIST, CERTIFIED REGISTERED

## 2022-06-07 PROCEDURE — 63600175 PHARM REV CODE 636 W HCPCS: Mod: PO | Performed by: NURSE ANESTHETIST, CERTIFIED REGISTERED

## 2022-06-07 PROCEDURE — 88305 TISSUE EXAM BY PATHOLOGIST: CPT | Mod: 26,,, | Performed by: PATHOLOGY

## 2022-06-07 PROCEDURE — 43239 EGD BIOPSY SINGLE/MULTIPLE: CPT | Mod: ,,, | Performed by: INTERNAL MEDICINE

## 2022-06-07 PROCEDURE — D9220A PRA ANESTHESIA: Mod: CRNA,,, | Performed by: NURSE ANESTHETIST, CERTIFIED REGISTERED

## 2022-06-07 PROCEDURE — D9220A PRA ANESTHESIA: Mod: ANES,,, | Performed by: ANESTHESIOLOGY

## 2022-06-07 RX ORDER — SODIUM CHLORIDE 0.9 % (FLUSH) 0.9 %
10 SYRINGE (ML) INJECTION
Status: DISCONTINUED | OUTPATIENT
Start: 2022-06-07 | End: 2022-06-07 | Stop reason: HOSPADM

## 2022-06-07 RX ORDER — PROPOFOL 10 MG/ML
VIAL (ML) INTRAVENOUS
Status: DISCONTINUED | OUTPATIENT
Start: 2022-06-07 | End: 2022-06-07

## 2022-06-07 RX ORDER — SODIUM CHLORIDE, SODIUM LACTATE, POTASSIUM CHLORIDE, CALCIUM CHLORIDE 600; 310; 30; 20 MG/100ML; MG/100ML; MG/100ML; MG/100ML
INJECTION, SOLUTION INTRAVENOUS CONTINUOUS
Status: DISCONTINUED | OUTPATIENT
Start: 2022-06-07 | End: 2022-06-07 | Stop reason: HOSPADM

## 2022-06-07 RX ORDER — ATROPINE SULFATE 0.4 MG/ML
INJECTION, SOLUTION ENDOTRACHEAL; INTRAMEDULLARY; INTRAMUSCULAR; INTRAVENOUS; SUBCUTANEOUS
Status: DISCONTINUED | OUTPATIENT
Start: 2022-06-07 | End: 2022-06-07

## 2022-06-07 RX ORDER — LIDOCAINE HCL/PF 100 MG/5ML
SYRINGE (ML) INTRAVENOUS
Status: DISCONTINUED | OUTPATIENT
Start: 2022-06-07 | End: 2022-06-07

## 2022-06-07 RX ADMIN — SODIUM CHLORIDE, SODIUM LACTATE, POTASSIUM CHLORIDE, AND CALCIUM CHLORIDE: .6; .31; .03; .02 INJECTION, SOLUTION INTRAVENOUS at 12:06

## 2022-06-07 RX ADMIN — ATROPINE SULFATE 0.4 MG: 0.4 INJECTION, SOLUTION INTRAMUSCULAR; INTRAVENOUS; SUBCUTANEOUS at 11:06

## 2022-06-07 RX ADMIN — PROPOFOL 25 MG: 10 INJECTION, EMULSION INTRAVENOUS at 12:06

## 2022-06-07 RX ADMIN — PROPOFOL 25 MG: 10 INJECTION, EMULSION INTRAVENOUS at 11:06

## 2022-06-07 RX ADMIN — PROPOFOL 150 MG: 10 INJECTION, EMULSION INTRAVENOUS at 11:06

## 2022-06-07 RX ADMIN — SODIUM CHLORIDE, SODIUM LACTATE, POTASSIUM CHLORIDE, AND CALCIUM CHLORIDE: .6; .31; .03; .02 INJECTION, SOLUTION INTRAVENOUS at 11:06

## 2022-06-07 RX ADMIN — PROPOFOL 50 MG: 10 INJECTION, EMULSION INTRAVENOUS at 11:06

## 2022-06-07 RX ADMIN — LIDOCAINE HYDROCHLORIDE 100 MG: 20 INJECTION, SOLUTION INTRAVENOUS at 11:06

## 2022-06-07 NOTE — PROVATION PATIENT INSTRUCTIONS
Discharge Summary/Instructions after an Endoscopic Procedure  Patient Name: Nas Jenkins  Patient MRN: [id number]  Patient YOB: 1950  Tuesday, June 7, 2022  Ariel Dimas MD  Dear patient,  As a result of recent federal legislation (The Federal Cures Act), you may   receive lab or pathology results from your procedure in your [facility]   account before your physician is able to contact you. Your physician or   their representative will relay the results to you with their   recommendations at their soonest availability.  Thank you,  RESTRICTIONS:  During your procedure today, you received medications for sedation.  These   medications may affect your judgment, balance and coordination.  Therefore,   for 24 hours, you have the following restrictions:   - DO NOT drive a car, operate machinery, make legal/financial decisions,   sign important papers or drink alcohol.    ACTIVITY:  Today: no heavy lifting, straining or running due to procedural   sedation/anesthesia.  The following day: return to full activity including work.  DIET:  Eat and drink normally unless instructed otherwise.     TREATMENT FOR COMMON SIDE EFFECTS:  - Mild abdominal pain, nausea, belching, bloating or excessive gas:  rest,   eat lightly and use a heating pad.  - Sore Throat: treat with throat lozenges and/or gargle with warm salt   water.  - Because air was used during the procedure, expelling large amounts of air   from your rectum or belching is normal.  - If a bowel prep was taken, you may not have a bowel movement for 1-3 days.    This is normal.  SYMPTOMS TO WATCH FOR AND REPORT TO YOUR PHYSICIAN:  1. Abdominal pain or bloating, other than gas cramps.  2. Chest pain.  3. Back pain.  4. Signs of infection such as: chills or fever occurring within 24 hours   after the procedure.  5. Rectal bleeding, which would show as bright red, maroon, or black stools.   (A tablespoon of blood from the rectum is not serious, especially  if   hemorrhoids are present.)  6. Vomiting.  7. Weakness or dizziness.  GO DIRECTLY TO THE NEAREST EMERGENCY ROOM IF YOU HAVE ANY OF THE FOLLOWING:      Difficulty breathing              Chills and/or fever over 101 F   Persistent vomiting and/or vomiting blood   Severe abdominal pain   Severe chest pain   Black, tarry stools   Bleeding- more than one tablespoon   Any other symptom or condition that you feel may need urgent attention  Your doctor recommends these additional instructions:  If any biopsies were taken, your doctors clinic will contact you in 1 to 2   weeks with any results.  We are waiting for your pathology results.   Continue your present medications.   You are being discharged to home.  For questions, problems or results please call your physician - Ariel Dimas MD at Work:  [phone].  EMERGENCY PHONE NUMBER: [phone], LAB RESULTS: [phone]  IF A COMPLICATION OR EMERGENCY SITUATION ARISES AND YOU ARE UNABLE TO REACH   YOUR PHYSICIAN - GO DIRECTLY TO THE EMERGENCY ROOM.  ___________________________________________  Nurse Signature  ___________________________________________  Patient/Designated Responsible Party Signature  Ariel Dimas MD  6/7/2022 12:21:27 PM  This report has been verified and signed electronically.  Dear patient,  As a result of recent federal legislation (The Federal Cures Act), you may   receive lab or pathology results from your procedure in your [facility]   account before your physician is able to contact you. Your physician or   their representative will relay the results to you with their   recommendations at their soonest availability.  Thank you.  PROVATION

## 2022-06-07 NOTE — TRANSFER OF CARE
Anesthesia Transfer of Care Note    Patient: Nas Jenkins    Procedure(s) Performed: Procedure(s) (LRB):  EGD (ESOPHAGOGASTRODUODENOSCOPY) (N/A)  COLONOSCOPY (N/A)    Patient location: PACU    Anesthesia Type: general    Transport from OR: Transported from OR on room air with adequate spontaneous ventilation    Post pain: adequate analgesia    Post assessment: no apparent anesthetic complications and tolerated procedure well    Post vital signs: stable    Level of consciousness: sedated and awake    Nausea/Vomiting: no nausea/vomiting    Complications: none    Transfer of care protocol was followed      Last vitals:   Visit Vitals  BP (!) 93/52   Pulse 75   Temp 36.5 °C (97.7 °F) (Skin)   Resp 17   Ht 6' (1.829 m)   Wt 71.7 kg (158 lb)   SpO2 (!) 92%   BMI 21.43 kg/m²

## 2022-06-07 NOTE — ANESTHESIA POSTPROCEDURE EVALUATION
Anesthesia Post Evaluation    Patient: Nas Jenkins    Procedure(s) Performed: Procedure(s) (LRB):  EGD (ESOPHAGOGASTRODUODENOSCOPY) (N/A)  COLONOSCOPY (N/A)    Final Anesthesia Type: general      Patient location during evaluation: PACU  Patient participation: Yes- Able to Participate  Level of consciousness: awake and alert and oriented  Post-procedure vital signs: reviewed and stable  Pain management: adequate  Airway patency: patent    PONV status at discharge: No PONV  Anesthetic complications: no      Cardiovascular status: blood pressure returned to baseline  Respiratory status: unassisted, spontaneous ventilation and room air  Hydration status: euvolemic  Follow-up not needed.          Vitals Value Taken Time   /63 06/07/22 1234   Temp 36.5 °C (97.7 °F) 06/07/22 1224   Pulse 58 06/07/22 1234   Resp 14 06/07/22 1234   SpO2 94 % 06/07/22 1234         No case tracking events are documented in the log.      Pain/Caren Score: Caren Score: 8 (6/7/2022 12:34 PM)

## 2022-06-07 NOTE — ANESTHESIA PREPROCEDURE EVALUATION
06/07/2022  Nas Jenkins is a 71 y.o., male.      Pre-op Assessment    I have reviewed the Patient Summary Reports.     I have reviewed the Nursing Notes. I have reviewed the NPO Status.   I have reviewed the Medications.     Review of Systems  Social:  Smoker Smoking Status: Current Every Day Smoker  Smokeless Tobacco Status: Never Used  Alcohol use: Not Currently  Drug use: Never       Cardiovascular:   CAD  CABG/stent  hyperlipidemia    Pulmonary:   COPD, moderate Shortness of breath    Hepatic/GI:   GERD, poorly controlled    Neurological:   Neuromuscular Disease,    Psych:   Psychiatric History          Physical Exam  General: Well nourished, Cooperative, Alert and Oriented    Airway:  Mallampati: II / II  Mouth Opening: Normal  TM Distance: 4 - 6 cm  Tongue: Normal    Dental:  Intact    Chest/Lungs:  Clear to auscultation, Normal Respiratory Rate    Heart:  Rate: Normal  Rhythm: Regular Rhythm  Sounds: Normal        Anesthesia Plan  Type of Anesthesia, risks & benefits discussed:    Anesthesia Type: Gen Natural Airway  Intra-op Monitoring Plan: Standard ASA Monitors  Induction:  IV  Informed Consent: Informed consent signed with the Patient and all parties understand the risks and agree with anesthesia plan.  All questions answered.   ASA Score: 4    Ready For Surgery From Anesthesia Perspective.     .

## 2022-06-07 NOTE — H&P
History & Physical - Short Stay  Gastroenterology      SUBJECTIVE:     Procedure: Colonoscopy and EGD    Chief Complaint/Indication for Procedure: Weight Loss and Change in Bowel Habits    PTA Medications   Medication Sig    aspirin (ECOTRIN) 81 MG EC tablet Take 1 tablet (81 mg total) by mouth once daily. Only takes every now and then    clopidogreL (PLAVIX) 75 mg tablet Take 1 tablet (75 mg total) by mouth once daily.    coenzyme Q10 200 mg capsule Take 200 mg by mouth once daily.    DULoxetine (CYMBALTA) 60 MG capsule Take 1 capsule (60 mg total) by mouth 2 (two) times daily.    EYEBRIGHT ORAL Take 1 capsule by mouth once daily.    fish oil-omega-3 fatty acids 300-1,000 mg capsule Take 1 capsule by mouth once daily.    flurbiprofen (OCUFEN) 0.03 % ophthalmic solution INSTILL 1 DROP IN SURGICAL EYE PRIOR TO PROCEDURE EVERY 30 MINUTES TIMES 4. BEGIN 2 HRS BEFORE SURGERY IN ASOU    HYDROcodone-acetaminophen (NORCO) 7.5-325 mg per tablet Take 1 tablet by mouth every 8 (eight) hours as needed for Pain.    Lactobacillus rhamnosus GG (CULTURELLE) 10 billion cell capsule Take 1 capsule by mouth once daily.    multivitamin with minerals (NENITA MULTIPLE/CHELATED MINERAL) tablet Take 1 tablet by mouth once daily.     phenylephrine HCL 2.5% (MYDFRIN) 2.5 % ophthalmic solution INSTILL 1 DROP IN SURGICAL EYE PRIOR TO PROCEDURE EVERY 5 MINUTES X 3 (START ASOU)    proparacaine (ALCAINE) 0.5 % ophthalmic solution INSTILL 1 DROP IN SURGICAL EYE PRIOR TO PROCEDURE PRIOR TO EACH ADMINISTRATION OF DILATING EYE DROPS    rosuvastatin (CRESTOR) 40 MG Tab TAKE 1 TABLET BY MOUTH EVERY DAY    sildenafiL (VIAGRA) 100 MG tablet Take 1 tablet (100 mg total) by mouth daily as needed for Erectile Dysfunction.    tamsulosin (FLOMAX) 0.4 mg Cap Take 1 capsule (0.4 mg total) by mouth once daily.    testosterone (ANDROGEL) 1.62 % (20.25 mg/1.25 gram) GlPk Place 2.5 g onto the skin once daily.    tropicamide 1% (MYDRIACYL) 1 % Drop  INSTILL 1 DROP IN SURGICAL EYE PRIOR TO PROCEDURE EVERY 5 MINUTES TIMES 3 (START ASOU)    TURMERIC ORAL Take 1 capsule by mouth once daily.    vitamin D (VITAMIN D3) 1000 units Tab Take 1,000 Units by mouth once daily.    albuterol (VENTOLIN HFA) 90 mcg/actuation inhaler Inhale 2 puffs into the lungs every 4 (four) hours as needed for Wheezing. Rescue    azelastine (ASTELIN) 137 mcg (0.1 %) nasal spray SPRAY 1 SPRAY INTO EACH NOSTRIL TWICE DAILY    cetirizine (ZYRTEC) 10 MG tablet Take 10 mg by mouth once daily.    clonazePAM (KLONOPIN) 1 MG tablet Take 1 tablet (1 mg total) by mouth 2 (two) times daily as needed for Anxiety.    cyclopentolate 1% (CYCLOGYL) 1 % ophthalmic solution INSTILL 1 DROP IN SURGICAL EYE PRIOR TO PROCEDURE EVERY 5 MINUTES TIMES 3 (START ASOU)    ferrous sulfate (FEOSOL) 325 mg (65 mg iron) Tab tablet Take 325 mg by mouth once daily.    mag carb/aluminum hydrox/algin (GAVISCON EXTRA STRENGTH ORAL) Take 1 tablet by mouth 4 (four) times daily as needed (for heartburn).    metoclopramide HCl (REGLAN) 5 MG tablet Take 1 tablet (5 mg total) by mouth 3 (three) times daily before meals.    nitroGLYCERIN (NITROSTAT) 0.4 MG SL tablet Place 1 tablet (0.4 mg total) under the tongue every 5 (five) minutes as needed for Chest pain.    phenylephrine HCL 10% (LESTER-SYNEPHRINE) 10 % ophthalmic solution INSTILL 1 DROP IN SURGICAL EYE PRIOR TO PROCEDURE EVERY 5 MINUTES TIMES 3, FOR ADDITIONAL DILATION AS NEEDED, IF PATIENT'S  SYSTOLIC BP IS <170 AND DIASTOLIC BP IS <100. EVERY 5 MINUTES TIMES 3, FOR ADDITIONAL DILATION AS NEEDED, IF PATIENT'S  SYSTOLIC BP IS <170 AND DIASTOLIC BP IS <100.    promethazine (PHENERGAN) 25 MG tablet Take 25 mg by mouth every 6 (six) hours as needed.        Review of patient's allergies indicates:  No Known Allergies     Past Medical History:   Diagnosis Date    BPH (benign prostatic hyperplasia)     Carpal tunnel syndrome     Coronary artery disease     2 stents     Fibromyalgia     Fractures     GERD (gastroesophageal reflux disease)     Hyperlipidemia     Lumbar disc disease     Male hypogonadism     PAD (peripheral artery disease)     2 stents in leg    PTSD (post-traumatic stress disorder)      Past Surgical History:   Procedure Laterality Date    BACK SURGERY      CORONARY ANGIOGRAPHY N/A 8/26/2019    Procedure: ANGIOGRAM, CORONARY ARTERY;  Surgeon: Ferny Tate MD;  Location: [facility] CATH;  Service: Cardiology;  Laterality: N/A;    CORONARY ANGIOPLASTY WITH STENT PLACEMENT      CORONARY STENT PLACEMENT N/A 8/26/2019    Procedure: INSERTION, STENT, CORONARY ARTERY;  Surgeon: Ferny Tate MD;  Location: [facility] CATH;  Service: Cardiology;  Laterality: N/A;    NOSE SURGERY      PERCUTANEOUS TRANSLUMINAL ANGIOPLASTY (PTA) OF PERIPHERAL VESSEL      ROBOT-ASSISTED LAPAROSCOPIC REPAIR OF INGUINAL HERNIA USING DA MANNY XI Bilateral 2/9/2022    Procedure: XI ROBOTIC REPAIR, HERNIA, INGUINAL;  Surgeon: Shade Gregory MD;  Location: [facility] OR;  Service: General;  Laterality: Bilateral;     Family History   Problem Relation Age of Onset    No Known Problems Mother     No Known Problems Father      Social History     Tobacco Use    Smoking status: Current Every Day Smoker     Packs/day: 0.50     Types: Cigarettes    Smokeless tobacco: Never Used   Substance Use Topics    Alcohol use: Not Currently    Drug use: Never         OBJECTIVE:     Vital Signs (Most Recent)       Physical Exam:                                                       GENERAL:  Comfortable, in no acute distress.                                 HEENT EXAM:  Nonicteric.  No adenopathy.  Oropharynx is clear.               NECK:  Supple.                                                               LUNGS:  Clear.                                                               CARDIAC:  Regular rate and rhythm.  S1, S2.  No murmur.                      ABDOMEN:  Soft, positive bowel sounds,  nontender.  No hepatosplenomegaly or masses.  No rebound or guarding.                                             EXTREMITIES:  No edema.     MENTAL STATUS:  Normal, alert and oriented.      ASSESSMENT/PLAN:     Assessment: Weight Loss and Change in Bowel Habits    Plan: Colonoscopy and EGD    Anesthesia Plan: General    ASA Grade: ASA 3 - Patient with moderate systemic disease with functional limitations    MALLAMPATI SCORE:  I (soft palate, uvula, fauces, and tonsillar pillars visible)

## 2022-06-07 NOTE — DISCHARGE SUMMARY
[place] - Endoscopy  Discharge Note  Short Stay    Discharge Note  Short Stay      SUMMARY     Admit Date: 6/7/2022    Attending Physician: Ariel Dimas MD     Discharge Physician: Ariel Dimas MD    Discharge Date: 6/7/2022 12:26 PM    Final Diagnosis: Weight loss [R63.4]    Disposition: HOME OR SELF CARE    Patient Instructions:   Current Discharge Medication List      CONTINUE these medications which have NOT CHANGED    Details   albuterol (VENTOLIN HFA) 90 mcg/actuation inhaler Inhale 2 puffs into the lungs every 4 (four) hours as needed for Wheezing. Rescue  Qty: 54 g, Refills: 3      aspirin (ECOTRIN) 81 MG EC tablet Take 1 tablet (81 mg total) by mouth once daily. Only takes every now and then  Refills: 0      azelastine (ASTELIN) 137 mcg (0.1 %) nasal spray SPRAY 1 SPRAY INTO EACH NOSTRIL TWICE DAILY  Qty: 30 mL, Refills: 1      cetirizine (ZYRTEC) 10 MG tablet Take 10 mg by mouth once daily.      clonazePAM (KLONOPIN) 1 MG tablet Take 1 tablet (1 mg total) by mouth 2 (two) times daily as needed for Anxiety.  Qty: 60 tablet, Refills: 0    Comments: This request is for a new prescription for a controlled substance as required by Federal/State law.  Associated Diagnoses: Anxiety      clopidogreL (PLAVIX) 75 mg tablet Take 1 tablet (75 mg total) by mouth once daily.  Qty: 90 tablet, Refills: 3      coenzyme Q10 200 mg capsule Take 200 mg by mouth once daily.      DULoxetine (CYMBALTA) 60 MG capsule Take 1 capsule (60 mg total) by mouth 2 (two) times daily.  Qty: 180 capsule, Refills: 3      EYEBRIGHT ORAL Take 1 capsule by mouth once daily.      ferrous sulfate (FEOSOL) 325 mg (65 mg iron) Tab tablet Take 325 mg by mouth once daily.      fish oil-omega-3 fatty acids 300-1,000 mg capsule Take 1 capsule by mouth once daily.      flurbiprofen (OCUFEN) 0.03 % ophthalmic solution INSTILL 1 DROP IN SURGICAL EYE PRIOR TO PROCEDURE EVERY 30 MINUTES TIMES 4. BEGIN 2 HRS BEFORE SURGERY IN AS[facility]       HYDROcodone-acetaminophen (NORCO) 7.5-325 mg per tablet Take 1 tablet by mouth every 8 (eight) hours as needed for Pain.      Lactobacillus rhamnosus GG (CULTURELLE) 10 billion cell capsule Take 1 capsule by mouth once daily.      mag carb/aluminum hydrox/algin (GAVISCON EXTRA STRENGTH ORAL) Take 1 tablet by mouth 4 (four) times daily as needed (for heartburn).      metoclopramide HCl (REGLAN) 5 MG tablet Take 1 tablet (5 mg total) by mouth 3 (three) times daily before meals.  Qty: 90 tablet, Refills: 5    Associated Diagnoses: Gastroparesis      multivitamin with minerals (NENITA MULTIPLE/CHELATED MINERAL) tablet Take 1 tablet by mouth once daily.       phenylephrine HCL 2.5% (MYDFRIN) 2.5 % ophthalmic solution INSTILL 1 DROP IN SURGICAL EYE PRIOR TO PROCEDURE EVERY 5 MINUTES X 3 (START ASOU)      proparacaine (ALCAINE) 0.5 % ophthalmic solution INSTILL 1 DROP IN SURGICAL EYE PRIOR TO PROCEDURE PRIOR TO EACH ADMINISTRATION OF DILATING EYE DROPS      rosuvastatin (CRESTOR) 40 MG Tab TAKE 1 TABLET BY MOUTH EVERY DAY  Qty: 90 tablet, Refills: 1      sildenafiL (VIAGRA) 100 MG tablet Take 1 tablet (100 mg total) by mouth daily as needed for Erectile Dysfunction.  Qty: 27 tablet, Refills: 2      tamsulosin (FLOMAX) 0.4 mg Cap Take 1 capsule (0.4 mg total) by mouth once daily.  Qty: 90 capsule, Refills: 3    Associated Diagnoses: Benign prostatic hyperplasia without lower urinary tract symptoms      testosterone (ANDROGEL) 1.62 % (20.25 mg/1.25 gram) GlPk Place 2.5 g onto the skin once daily.  Qty: 180 packet, Refills: 1      tropicamide 1% (MYDRIACYL) 1 % Drop INSTILL 1 DROP IN SURGICAL EYE PRIOR TO PROCEDURE EVERY 5 MINUTES TIMES 3 (START ASOU)      TURMERIC ORAL Take 1 capsule by mouth once daily.      vitamin D (VITAMIN D3) 1000 units Tab Take 1,000 Units by mouth once daily.      cyclopentolate 1% (CYCLOGYL) 1 % ophthalmic solution INSTILL 1 DROP IN SURGICAL EYE PRIOR TO PROCEDURE EVERY 5 MINUTES TIMES 3 (START  ASOU)      nitroGLYCERIN (NITROSTAT) 0.4 MG SL tablet Place 1 tablet (0.4 mg total) under the tongue every 5 (five) minutes as needed for Chest pain.  Qty: 90 tablet, Refills: 3      phenylephrine HCL 10% (LESTER-SYNEPHRINE) 10 % ophthalmic solution INSTILL 1 DROP IN SURGICAL EYE PRIOR TO PROCEDURE EVERY 5 MINUTES TIMES 3, FOR ADDITIONAL DILATION AS NEEDED, IF PATIENT'S  SYSTOLIC BP IS <170 AND DIASTOLIC BP IS <100. EVERY 5 MINUTES TIMES 3, FOR ADDITIONAL DILATION AS NEEDED, IF PATIENT'S  SYSTOLIC BP IS <170 AND DIASTOLIC BP IS <100.      promethazine (PHENERGAN) 25 MG tablet Take 25 mg by mouth every 6 (six) hours as needed.              Discharge Procedure Orders (must include Diet, Follow-up, Activity)    Follow Up:  Follow up with PCP as previously scheduled  Resume routine diet.  Activity as tolerated.    No driving day of procedure.

## 2022-06-07 NOTE — PROVATION PATIENT INSTRUCTIONS
Discharge Summary/Instructions after an Endoscopic Procedure  Patient Name: Nas Jenkins  Patient MRN: [id number]  Patient YOB: 1950  Tuesday, June 7, 2022  Ariel Dimas MD  Dear patient,  As a result of recent federal legislation (The Federal Cures Act), you may   receive lab or pathology results from your procedure in your [facility]   account before your physician is able to contact you. Your physician or   their representative will relay the results to you with their   recommendations at their soonest availability.  Thank you,  RESTRICTIONS:  During your procedure today, you received medications for sedation.  These   medications may affect your judgment, balance and coordination.  Therefore,   for 24 hours, you have the following restrictions:   - DO NOT drive a car, operate machinery, make legal/financial decisions,   sign important papers or drink alcohol.    ACTIVITY:  Today: no heavy lifting, straining or running due to procedural   sedation/anesthesia.  The following day: return to full activity including work.  DIET:  Eat and drink normally unless instructed otherwise.     TREATMENT FOR COMMON SIDE EFFECTS:  - Mild abdominal pain, nausea, belching, bloating or excessive gas:  rest,   eat lightly and use a heating pad.  - Sore Throat: treat with throat lozenges and/or gargle with warm salt   water.  - Because air was used during the procedure, expelling large amounts of air   from your rectum or belching is normal.  - If a bowel prep was taken, you may not have a bowel movement for 1-3 days.    This is normal.  SYMPTOMS TO WATCH FOR AND REPORT TO YOUR PHYSICIAN:  1. Abdominal pain or bloating, other than gas cramps.  2. Chest pain.  3. Back pain.  4. Signs of infection such as: chills or fever occurring within 24 hours   after the procedure.  5. Rectal bleeding, which would show as bright red, maroon, or black stools.   (A tablespoon of blood from the rectum is not serious, especially  if   hemorrhoids are present.)  6. Vomiting.  7. Weakness or dizziness.  GO DIRECTLY TO THE NEAREST EMERGENCY ROOM IF YOU HAVE ANY OF THE FOLLOWING:      Difficulty breathing              Chills and/or fever over 101 F   Persistent vomiting and/or vomiting blood   Severe abdominal pain   Severe chest pain   Black, tarry stools   Bleeding- more than one tablespoon   Any other symptom or condition that you feel may need urgent attention  Your doctor recommends these additional instructions:  If any biopsies were taken, your doctors clinic will contact you in 1 to 2   weeks with any results.  We are waiting for your pathology results.   Your physician has recommended a repeat colonoscopy (date to be determined   after pending pathology results are reviewed) for retreatment.   You are being discharged to home.  For questions, problems or results please call your physician - Ariel Dimas MD at Work:  [phone].  EMERGENCY PHONE NUMBER: [phone], LAB RESULTS: [phone]  IF A COMPLICATION OR EMERGENCY SITUATION ARISES AND YOU ARE UNABLE TO REACH   YOUR PHYSICIAN - GO DIRECTLY TO THE EMERGENCY ROOM.  ___________________________________________  Nurse Signature  ___________________________________________  Patient/Designated Responsible Party Signature  Ariel Dimas MD  6/7/2022 12:25:42 PM  This report has been verified and signed electronically.  Dear patient,  As a result of recent federal legislation (The Federal Cures Act), you may   receive lab or pathology results from your procedure in your [facility]   account before your physician is able to contact you. Your physician or   their representative will relay the results to you with their   recommendations at their soonest availability.  Thank you.  PROVATION

## 2022-06-15 LAB
FINAL PATHOLOGIC DIAGNOSIS: NORMAL
Lab: NORMAL

## 2022-06-28 ENCOUNTER — TELEPHONE (OUTPATIENT)
Dept: GASTROENTEROLOGY | Facility: CLINIC | Age: 72
End: 2022-06-28
Payer: OTHER GOVERNMENT

## 2022-06-28 NOTE — TELEPHONE ENCOUNTER
----- Message from Lucien Sánchez sent at 6/28/2022  1:14 PM CDT -----  Type:  Patient Returning Call    Who Called:  Roxann Jenkins (Spouse)  Who Left Message for Patient: Fariba  Does the patient know what this is regarding?: Referral to surgeon  Best Call Back Number:  [phone]  Additional Information:

## 2022-06-28 NOTE — TELEPHONE ENCOUNTER
Roxann was f/u on referral to Dr. Henriquez for EMR.     Left message with Dr. Henriquez's office to call pt to schedule or call office to update. Numbers provided.

## 2022-06-30 ENCOUNTER — EXTERNAL CHRONIC CARE MANAGEMENT (OUTPATIENT)
Dept: PRIMARY CARE CLINIC | Facility: CLINIC | Age: 72
End: 2022-06-30
Payer: MEDICARE

## 2022-06-30 PROCEDURE — 99490 PR CHRONIC CARE MGMT, 1ST 20 MIN: ICD-10-PCS | Mod: S$GLB,,, | Performed by: PHYSICIAN ASSISTANT

## 2022-06-30 PROCEDURE — 99490 CHRNC CARE MGMT STAFF 1ST 20: CPT | Mod: S$GLB,,, | Performed by: PHYSICIAN ASSISTANT

## 2022-07-31 ENCOUNTER — EXTERNAL CHRONIC CARE MANAGEMENT (OUTPATIENT)
Dept: PRIMARY CARE CLINIC | Facility: CLINIC | Age: 72
End: 2022-07-31
Payer: MEDICARE

## 2022-07-31 PROCEDURE — 99490 PR CHRONIC CARE MGMT, 1ST 20 MIN: ICD-10-PCS | Mod: S$GLB,,, | Performed by: PHYSICIAN ASSISTANT

## 2022-07-31 PROCEDURE — 99490 CHRNC CARE MGMT STAFF 1ST 20: CPT | Mod: S$GLB,,, | Performed by: PHYSICIAN ASSISTANT

## 2022-08-11 ENCOUNTER — TELEPHONE (OUTPATIENT)
Dept: GASTROENTEROLOGY | Facility: CLINIC | Age: 72
End: 2022-08-11
Payer: OTHER GOVERNMENT

## 2022-08-11 NOTE — TELEPHONE ENCOUNTER
----- Message from Remigio Andre sent at 8/11/2022 12:01 PM CDT -----  Contact: pt's wife Federica at [phone]  Type: Needs Medical Advice  Who Called:  pt'efrem Stallworth  Best Call Back Number: [phone]  Additional Information: pt's wife Federica is calling the office to let them know that the dr needs to send another referral to see another dr so the [facility] will [facility] and the fax number is [phone] have to have a primary note from the dr why he wanted her  to see another dr. Please call back and advise.  PER THE PT'S WIFE DR VERONICA COULDN'T DO THE SURGERY YESTERDAY COULDN'T GET TO POLYP

## 2022-08-11 NOTE — TELEPHONE ENCOUNTER
Copied and pasted from Dr. Johnson colonoscopy procedure note:     - Will discuss case with referring physician Dr. Dimas. Due to inability to remove polyp                          endoscopically, would recommend referral to                          general surgery for surgical resection of polyp.                          Patient is requesting referral to come from you for a general surgeon.

## 2022-08-12 ENCOUNTER — TELEPHONE (OUTPATIENT)
Dept: GASTROENTEROLOGY | Facility: CLINIC | Age: 72
End: 2022-08-12
Payer: OTHER GOVERNMENT

## 2022-08-12 NOTE — TELEPHONE ENCOUNTER
----- Message from Naa Rdz sent at 8/12/2022 12:20 PM CDT -----  Contact: self  The patients wife called and asked to speak to Fariba or any available nurse to get her 's paperwork for the [facility]. She states this is urgent and would like a call today at [phone].

## 2022-08-12 NOTE — TELEPHONE ENCOUNTER
Returned call to the marlenetent's wife, she states that Mr Nas had his procedure with Dr. Henriquez and that the Polyp was too big in size and location to be removed and Dr. Henriquez stated to them that he was going to notify Dr. Dimas to get the patient set up with a colorectal surgeon and that the referral needed to be obtained from the [facility] per her call to the [facility].  Patient requests a call back on Monday so that she can discuss all of this with Dr. Dimas's nurse Fariba.

## 2022-08-12 NOTE — TELEPHONE ENCOUNTER
----- Message from Naa Rdz sent at 8/12/2022 12:20 PM CDT -----  Contact: self  The patients wife called and asked to speak to Fariba or any available nurse to get her 's paperwork for the [facility]. She states this is urgent and would like a call today at [phone].

## 2022-08-16 ENCOUNTER — TELEPHONE (OUTPATIENT)
Dept: GASTROENTEROLOGY | Facility: CLINIC | Age: 72
End: 2022-08-16
Payer: OTHER GOVERNMENT

## 2022-08-16 DIAGNOSIS — Z98.890 S/P COLONOSCOPY WITH POLYPECTOMY: Primary | ICD-10-CM

## 2022-08-16 NOTE — TELEPHONE ENCOUNTER
----- Message from Fariba Fong LPN sent at 8/15/2022  3:20 PM CDT -----  Regarding: FW: advice  Contact: wifeRoxann    ----- Message -----  From: Ruba Nichols  Sent: 8/15/2022   2:22 PM CDT  To: [facility] Staff  Subject: advice                                           Type: Needs Medical Advice  Who Called:  wifeRoxann  Symptoms (please be specific):    How long has patient had these symptoms:    Pharmacy name and phone #:    Best Call Back Number: [phone] (home)   Additional Information: Wife states Dr Henriquez was unable to dislodge the polyp. He advised patient to see a colon rectal surgeon. [facility] is [facility] on Dr Henriquez's procedure and the referral to the colon rectal surgeon doctor recommended.  Please fax to the [facility] at [phone]. Roxann is sending the doctor notes from Dr Henriquez's procedure and pictures. Please call patient to advise.Thanks!

## 2022-08-16 NOTE — TELEPHONE ENCOUNTER
Spoke with pt's wife, Roxann. Dr. Henriquez was unable to remove all of polyp & recommending pt see CRS for removal. They would like to go to [facility], Dr. Mejia. [facility]erral [facility] & [facility] to [facility] & [facility] Mejia's office. Roxann to call Dr. Mejia's office to schedule tomorrow.

## 2022-08-23 ENCOUNTER — TELEPHONE (OUTPATIENT)
Dept: GASTROENTEROLOGY | Facility: CLINIC | Age: 72
End: 2022-08-23
Payer: OTHER GOVERNMENT

## 2022-08-23 NOTE — TELEPHONE ENCOUNTER
----- Message from Amanda Díaz sent at 8/23/2022  8:41 AM CDT -----  Contact: Wife Ignacia  Type:  Patient Requesting A Return Call    Who Called:  Ignacia, wife  Who Left Message for Patient:  n/a- requesting to speak to Fariba  Does the patient know what this is regarding?:  [facility] to [facility] Mejia  Best Call Back Number:  [phone]  Additional Information:  Please call Mrs Jenkins back to discuss, states Dr Mejia hasn't heard from the [facility] yet and the appt is tomorrow. Thank You.

## 2022-08-23 NOTE — TELEPHONE ENCOUNTER
----- Message from Tita Beard sent at 8/23/2022 10:14 AM CDT -----  Contact: WifeIgnacia  Type: Needs Medical Advice    Who Called:  Wife    Best Call Back Number: [phone]    Additional Information: Wants to know what day & fax # that referral was faxed to [facility].    Please call back.  Thanks.

## 2022-08-23 NOTE — TELEPHONE ENCOUNTER
Sp[facility] with [facility]. Shelby is faxing over Request for Services form to be filled out for pt. Shelby asked for a call back once form is faxed.   Shelby's number is [phone]. Shelby to update pt's wife.

## 2022-08-24 ENCOUNTER — TELEPHONE (OUTPATIENT)
Dept: GASTROENTEROLOGY | Facility: CLINIC | Age: 72
End: 2022-08-24
Payer: OTHER GOVERNMENT

## 2022-08-24 NOTE — TELEPHONE ENCOUNTER
----- Message from Thais Whitt sent at 8/24/2022  2:56 PM CDT -----  Contact: [phone]  Type: Needs Medical Advice  Who Called:  Shelby from [facility]      Best Call Back Number: [phone]  Additional Information: Shelby is calling from the [facility]. She needs a diagnosis code to put on pts orders for services. Pls call back and advise. She will be leaving the office at 4pm

## 2022-08-24 NOTE — TELEPHONE ENCOUNTER
----- Message from Rosario Serrano sent at 8/24/2022  1:26 PM CDT -----  Contact: Wife/ Roxann  Type: Needs Medical Advice    Who Called: Wife/ Roxann    Best Call Back Number: [phone]   Additional Information: The caller is following up to make sure the [facility] forms were sent to the office.

## 2022-08-24 NOTE — TELEPHONE ENCOUNTER
Spoke with Roxann. Informed [facility] forms were faxed yesterday evening & a confirmation was received. Roxann states Bev has not received fax. Informed Roxann I will refax papers. Roxann to call Bev at [facility] to inform her of this.

## 2022-08-26 ENCOUNTER — TELEPHONE (OUTPATIENT)
Dept: GASTROENTEROLOGY | Facility: CLINIC | Age: 72
End: 2022-08-26
Payer: OTHER GOVERNMENT

## 2022-08-26 NOTE — TELEPHONE ENCOUNTER
Spoke with pt's wife. Wife states fax from [facility] was sent to wrong office & wife wanted to know if we have the fax; if so, if we could fax to Dr. Mejia's office ([phone]). informed wife I didn't think we still had fax but would check. If we had fax, we would send to number she provided. Wife verbalized understanding.

## 2022-08-26 NOTE — TELEPHONE ENCOUNTER
----- Message from Brandon Guzman sent at 8/26/2022  1:58 PM CDT -----  Type: Needs Medical Advice  Who Called:  Pt's wife Shelby Zapien Call Back Number: [phone] (  Additional Information: Sts she needs a call back from the nurse as soon as she can.  Please advise -- Thank you

## 2022-08-31 ENCOUNTER — EXTERNAL CHRONIC CARE MANAGEMENT (OUTPATIENT)
Dept: PRIMARY CARE CLINIC | Facility: CLINIC | Age: 72
End: 2022-08-31
Payer: OTHER GOVERNMENT

## 2022-08-31 PROCEDURE — 99490 CHRNC CARE MGMT STAFF 1ST 20: CPT | Mod: S$GLB,,, | Performed by: PHYSICIAN ASSISTANT

## 2022-08-31 PROCEDURE — 99490 PR CHRONIC CARE MGMT, 1ST 20 MIN: ICD-10-PCS | Mod: S$GLB,,, | Performed by: PHYSICIAN ASSISTANT

## 2022-10-06 PROBLEM — C18.9 ADENOCARCINOMA OF COLON: Status: ACTIVE | Noted: 2022-10-06

## 2024-03-11 ENCOUNTER — TELEPHONE (OUTPATIENT)
Dept: PODIATRY | Facility: CLINIC | Age: 74
End: 2024-03-11
Payer: OTHER GOVERNMENT

## 2024-03-11 NOTE — TELEPHONE ENCOUNTER
----- Message from Ada Lundy, CRT sent at 3/11/2024 11:36 AM CDT -----  Regarding: [facility] ER follow up  3/8/2024  [facility] ER follow up.  3/8/2024      Discussed with Dr. Butcher of Podiatry.  We will try to get the patient into his clinic for further wound care and debridement and monitoring of his treatment.   Dalvance given.   Placed on Bactrim twice daily.  Afraid it might affect his kidney function.    Can you please assist with scheduling follow up.    Ada Lundy   [facility]   Emergency Room Navigator/Outpatient Care Coordination  [phone]          Spoke with pt's wife and scheduled an appointment for 3/13 @ 1:00.

## 2024-03-13 ENCOUNTER — OFFICE VISIT (OUTPATIENT)
Dept: PODIATRY | Facility: CLINIC | Age: 74
End: 2024-03-13
Payer: OTHER GOVERNMENT

## 2024-03-13 VITALS — WEIGHT: 141.31 LBS | BODY MASS INDEX: 20.23 KG/M2 | HEIGHT: 70 IN

## 2024-03-13 DIAGNOSIS — I96 NECROTIC ESCHAR: Primary | ICD-10-CM

## 2024-03-13 DIAGNOSIS — S91.301A OPEN WOUND OF RIGHT HEEL, INITIAL ENCOUNTER: ICD-10-CM

## 2024-03-13 PROCEDURE — 87075 CULTR BACTERIA EXCEPT BLOOD: CPT | Performed by: STUDENT IN AN ORGANIZED HEALTH CARE EDUCATION/TRAINING PROGRAM

## 2024-03-13 PROCEDURE — 11042 DBRDMT SUBQ TIS 1ST 20SQCM/<: CPT | Mod: PBBFAC,PO | Performed by: STUDENT IN AN ORGANIZED HEALTH CARE EDUCATION/TRAINING PROGRAM

## 2024-03-13 PROCEDURE — 87106 FUNGI IDENTIFICATION YEAST: CPT | Performed by: STUDENT IN AN ORGANIZED HEALTH CARE EDUCATION/TRAINING PROGRAM

## 2024-03-13 PROCEDURE — 99213 OFFICE O/P EST LOW 20 MIN: CPT | Mod: PBBFAC,PO,25 | Performed by: STUDENT IN AN ORGANIZED HEALTH CARE EDUCATION/TRAINING PROGRAM

## 2024-03-13 PROCEDURE — 87070 CULTURE OTHR SPECIMN AEROBIC: CPT | Performed by: STUDENT IN AN ORGANIZED HEALTH CARE EDUCATION/TRAINING PROGRAM

## 2024-03-13 PROCEDURE — 99204 OFFICE O/P NEW MOD 45 MIN: CPT | Mod: 25,S$PBB,, | Performed by: STUDENT IN AN ORGANIZED HEALTH CARE EDUCATION/TRAINING PROGRAM

## 2024-03-13 PROCEDURE — G0180 MD CERTIFICATION HHA PATIENT: HCPCS | Mod: ,,, | Performed by: STUDENT IN AN ORGANIZED HEALTH CARE EDUCATION/TRAINING PROGRAM

## 2024-03-13 PROCEDURE — 99999 PR PBB SHADOW E&M-EST. PATIENT-LVL III: CPT | Mod: PBBFAC,,, | Performed by: STUDENT IN AN ORGANIZED HEALTH CARE EDUCATION/TRAINING PROGRAM

## 2024-03-13 NOTE — PROGRESS NOTES
Subjective:      Patient ID: Nas Jenkins Jr. is a 73 y.o. male.    Chief Complaint: Wound Care (Bottom of rt heel/foot)    Mr. Jenkins presents with R heel wound for at least 2 weeks. He has no idea how the wound got there but it is painful. He has h/o of PAD requiring stents. He denies F/N/V/C.     Review of Systems   Skin:         Ulcer   All other systems reviewed and are negative.          Objective:      Physical Exam  Cardiovascular:      Pulses:           Dorsalis pedis pulses are 0 on the right side.        Posterior tibial pulses are 0 on the right side.   Feet:      Right foot:      Skin integrity: Ulcer present.      Comments: Ulcer Plantar R foot measures 3.5x1.5x0.2cm. There is necrotic eschar at base and fibrogranular base deep to the eschar. No deep probing, fluctuance, crepitus, or surrounding cellulitis appreciated.               Assessment:       Encounter Diagnoses   Name Primary?    Necrotic eschar Yes    Open wound of right heel, initial encounter          Plan:       Nas was seen today for wound care.    Diagnoses and all orders for this visit:    Necrotic eschar  -     Culture, Anaerobic  -     Aerobic culture    Open wound of right heel, initial encounter  -     Culture, Anaerobic  -     Aerobic culture      I counseled the patient on his conditions, their implications and medical management.    Wound debrided today and cultures taken.    HH ordered.  Please change dressing M and Th:    -Cleanse wounds with sterile saline or wound cleanser.  -Pat Dry.  -Apply  iodosorb, kimberly foam  to wound.  -Cover with cast padding and coban with no compression .    Thank you.      Offloading boot ordered and fitted.    May need referral to Dr. Tate pending how this wound is healing.     F/u 2 weeks.    Wound Debridement    Date/Time: 3/13/2024 2:00 PM    Performed by: David Butcher DPM  Authorized by: David Butcher DPM    Consent Done?:  Yes (Verbal)    Wound Details:    Location:  Right  foot    Location:  Right Heel    Type of Debridement:  Excisional       Length (cm):  3.5       Area (sq cm):  5.25       Width (cm):  1.5       Percent Debrided (%):  100       Depth (cm):  0.2       Total Area Debrided (sq cm):  5.25    Depth of debridement:  Subcutaneous tissue    Tissue debrided:  Subcutaneous    Devitalized tissue debrided:  Necrotic/Eschar    Instruments:  Blade and Forceps  Bleeding:  Minimal  Hemostasis Achieved: Yes  Method Used:  Alginate  Patient tolerance:  Patient tolerated the procedure well with no immediate complications  1st Wound Pain Assessment: 8

## 2024-03-15 ENCOUNTER — TELEPHONE (OUTPATIENT)
Dept: PODIATRY | Facility: CLINIC | Age: 74
End: 2024-03-15
Payer: OTHER GOVERNMENT

## 2024-03-15 NOTE — TELEPHONE ENCOUNTER
----- Message from Lazara Laird sent at 3/15/2024  2:32 PM CDT -----  Type:  Patient Returning Call    Who Called: Ignacia  Spouse  Who Left Message for Patient:MCKENNA IRVING   Does the patient know what this is regarding?:yes/no  Would the patient rather a call back or a response via [facility]? call back   Best Call Back Number:[phone]   Additional Information: Spouse states It was [facility]  Thanks

## 2024-03-15 NOTE — TELEPHONE ENCOUNTER
----- Message from Ely Talbert sent at 3/15/2024  8:04 AM CDT -----  Regarding: ADVICE  Contact: Wife [phone]  Type: Needs Medical Advice    Who Called:  Wife / Ignacia Zapien Call Back Number: [phone]    Additional Information: Ulcers on heel were cut off on Wednesday, the doctor asked them to leave bandaging on for 2 days, but home health yesterday (only 1 day later) re-did the bandaging and cut more skin off, and also cleaned out the wound and re-dressed it. Now the pain is unbearable. So much pain it is unreal. Can't even wear his boot. No rest at all, Pain Rx not helping at all. Please call to advise options.

## 2024-03-15 NOTE — TELEPHONE ENCOUNTER
----- Message from Ksenia España sent at 3/15/2024  1:46 PM CDT -----  Regarding: Pt Advice  Contact: pt wife  Needs Medical Advice      Who Called: Ignacia         Would the patient rather a call back or a response via [facility]? Call back    Best Call Back Number:  [phone]      Additional Information: Sts is checking back in about a message sent earlier and is wanting to hear a response before end of work day today due to going into the weekend. Says that it is an urgent matter.   Please Advise - Thank you

## 2024-03-15 NOTE — TELEPHONE ENCOUNTER
Spoke with patient per message. Says that the Home Health nurse came and changed the bandage on husbands foot and did not put enough wrap,  couldn't put shoe on and needed something for pain  because he couldn't sleep. His dr is not in the office today and I told her that I would send a message to him.I also gave her the number to [facility] to call to see if they would send someone out.

## 2024-03-15 NOTE — TELEPHONE ENCOUNTER
Called patient back to let her know that I received her message and that I hadn't received a message from dr. Butcher. I also said that she could give us a call back if she needed anything.

## 2024-03-15 NOTE — TELEPHONE ENCOUNTER
Spoke with pt's wife and let her know that I had sent the message to Dr. Butcher and was waiting for a response from the dr. Pt's wife verbalized understanding.

## 2024-03-16 LAB — BACTERIA SPEC AEROBE CULT: ABNORMAL

## 2024-03-18 ENCOUNTER — TELEPHONE (OUTPATIENT)
Dept: PODIATRY | Facility: CLINIC | Age: 74
End: 2024-03-18
Payer: OTHER GOVERNMENT

## 2024-03-18 LAB — BACTERIA SPEC ANAEROBE CULT: NORMAL

## 2024-03-18 NOTE — TELEPHONE ENCOUNTER
----- Message from Diamone Speed sent at 3/18/2024  3:27 PM CDT -----  Regarding: wife  Type: Patient Call Back       Who called: wife        What is the request in detail: pt wife would like a call back about pt biopsy        Can the clinic reply by [facility]? Yes       Would the patient rather a call back or a response via [facility]? Call back       Best call back number:[phone]

## 2024-03-18 NOTE — TELEPHONE ENCOUNTER
Spoke with pt's wife regarding culture results. I informed pt's wife that Dr. Butcher would reach out to them after he reviews the results.

## 2024-03-20 ENCOUNTER — TELEPHONE (OUTPATIENT)
Dept: PODIATRY | Facility: CLINIC | Age: 74
End: 2024-03-20
Payer: OTHER GOVERNMENT

## 2024-03-20 DIAGNOSIS — G89.29 CHRONIC PAIN IN RIGHT FOOT: Primary | ICD-10-CM

## 2024-03-20 DIAGNOSIS — L97.519 ULCER OF RIGHT FOOT, UNSPECIFIED ULCER STAGE: ICD-10-CM

## 2024-03-20 DIAGNOSIS — M79.671 CHRONIC PAIN IN RIGHT FOOT: Primary | ICD-10-CM

## 2024-03-20 RX ORDER — OXYCODONE AND ACETAMINOPHEN 10; 325 MG/1; MG/1
1 TABLET ORAL EVERY 4 HOURS PRN
Qty: 42 TABLET | Refills: 0 | Status: SHIPPED | OUTPATIENT
Start: 2024-03-20 | End: 2024-03-27

## 2024-03-20 RX ORDER — GABAPENTIN 100 MG/1
100 CAPSULE ORAL 3 TIMES DAILY
Qty: 90 CAPSULE | Refills: 0 | Status: SHIPPED | OUTPATIENT
Start: 2024-03-20 | End: 2024-06-07

## 2024-03-20 RX ORDER — LIDOCAINE 50 MG/G
1 PATCH TOPICAL DAILY
Qty: 30 PATCH | Refills: 2 | Status: SHIPPED | OUTPATIENT
Start: 2024-03-20 | End: 2024-06-18

## 2024-03-20 NOTE — TELEPHONE ENCOUNTER
----- Message from Beatriz Collins sent at 3/20/2024 12:04 PM CDT -----  Contact: pt  Type: Needs Medical Advice         Who Called: pt  Best Call Back Number:[phone]  Additional Information: Requesting a call back regarding  pt needs office to call to change the appt on 03/27. Pt will be in hospital that day.   Please Advise- Thank you

## 2024-03-29 ENCOUNTER — NURSE TRIAGE (OUTPATIENT)
Dept: ADMINISTRATIVE | Facility: CLINIC | Age: 74
End: 2024-03-29
Payer: OTHER GOVERNMENT

## 2024-03-29 NOTE — TELEPHONE ENCOUNTER
Wife, Roxann, states pt is having visual hallucinations. Meds have been increased. Had anesthesia Wednesday. Thinks things are missing. Seeing trees moving around.   Care advice provided per protocol, with recommendation to call 911 at this time. Caller BREANNE.     Unable to route to PCP    Reason for Disposition   [1] Difficult to awaken or acting confused (e.g., disoriented, slurred speech) AND [2] present now AND [3] new-onset    Additional Information   Negative: [1] Difficult to awaken or acting confused (e.g., disoriented, slurred speech) AND [2] present now AND [3] diabetes mellitus    Protocols used: Confusion - Delirium-A-AH

## 2024-03-31 PROBLEM — E44.0 MALNUTRITION OF MODERATE DEGREE: Status: ACTIVE | Noted: 2024-03-31

## 2024-03-31 PROBLEM — F23 ACUTE PSYCHOSIS: Status: ACTIVE | Noted: 2024-03-31

## 2024-03-31 PROBLEM — L97.519 ULCER OF RIGHT FOOT: Status: ACTIVE | Noted: 2024-03-31

## 2024-04-01 PROBLEM — J44.9 COPD (CHRONIC OBSTRUCTIVE PULMONARY DISEASE): Status: ACTIVE | Noted: 2024-04-01

## 2024-04-01 PROBLEM — Z71.89 ACP (ADVANCE CARE PLANNING): Status: ACTIVE | Noted: 2024-04-01

## 2024-04-02 ENCOUNTER — TELEPHONE (OUTPATIENT)
Dept: PODIATRY | Facility: CLINIC | Age: 74
End: 2024-04-02
Payer: OTHER GOVERNMENT

## 2024-04-02 NOTE — TELEPHONE ENCOUNTER
Spoke with pt's wife in regards to pt being in the hospital. Pt's wife wants to know why the procedure of getting blood flow to pt's lower extremities can't be done this week since pt is already in the hospital. I told her that I would speak with Dr. Butcher and get back with her. Pt's wife verbalized understanding.

## 2024-04-08 PROBLEM — E44.0 MALNUTRITION OF MODERATE DEGREE: Status: RESOLVED | Noted: 2024-03-31 | Resolved: 2024-04-08

## 2024-04-08 PROBLEM — E43 SEVERE MALNUTRITION: Status: ACTIVE | Noted: 2024-04-08

## 2024-04-15 ENCOUNTER — EXTERNAL HOME HEALTH (OUTPATIENT)
Dept: HOME HEALTH SERVICES | Facility: HOSPITAL | Age: 74
End: 2024-04-15
Payer: MEDICARE

## 2024-04-17 ENCOUNTER — TELEPHONE (OUTPATIENT)
Dept: VASCULAR SURGERY | Facility: CLINIC | Age: 74
End: 2024-04-17
Payer: OTHER GOVERNMENT

## 2024-04-22 ENCOUNTER — TELEPHONE (OUTPATIENT)
Dept: PODIATRY | Facility: CLINIC | Age: 74
End: 2024-04-22
Payer: OTHER GOVERNMENT

## 2024-04-22 NOTE — TELEPHONE ENCOUNTER
----- Message from Kristina Mcgovern sent at 4/22/2024 10:49 AM CDT -----  Type: Needs Medical Advice  Who Called:  pt wife, Ignacia  Symptoms (please be specific):  said he have an appt on 4/24 and she have another appt on the 25th--said sh need to know if his appt can be moved to the 25th as well she can make 2 appt back to back like that--please call and advise    Best Call Back Number: [phone] (home)     Additional Information: thank you

## 2024-04-25 ENCOUNTER — OFFICE VISIT (OUTPATIENT)
Dept: PODIATRY | Facility: CLINIC | Age: 74
End: 2024-04-25
Payer: OTHER GOVERNMENT

## 2024-04-25 ENCOUNTER — OFFICE VISIT (OUTPATIENT)
Dept: VASCULAR SURGERY | Facility: CLINIC | Age: 74
End: 2024-04-25
Payer: OTHER GOVERNMENT

## 2024-04-25 VITALS — HEIGHT: 72 IN | BODY MASS INDEX: 16.12 KG/M2 | WEIGHT: 119.06 LBS

## 2024-04-25 VITALS
DIASTOLIC BLOOD PRESSURE: 80 MMHG | HEIGHT: 73 IN | HEART RATE: 67 BPM | SYSTOLIC BLOOD PRESSURE: 138 MMHG | BODY MASS INDEX: 15.78 KG/M2 | WEIGHT: 119.06 LBS

## 2024-04-25 DIAGNOSIS — I96 NECROTIC ESCHAR: ICD-10-CM

## 2024-04-25 DIAGNOSIS — G89.29 CHRONIC PAIN IN RIGHT FOOT: Primary | ICD-10-CM

## 2024-04-25 DIAGNOSIS — M79.671 CHRONIC PAIN IN RIGHT FOOT: Primary | ICD-10-CM

## 2024-04-25 DIAGNOSIS — M79.671 RIGHT FOOT PAIN: Primary | ICD-10-CM

## 2024-04-25 DIAGNOSIS — L97.519 ULCER OF RIGHT FOOT, UNSPECIFIED ULCER STAGE: ICD-10-CM

## 2024-04-25 DIAGNOSIS — I73.9 PAD (PERIPHERAL ARTERY DISEASE): ICD-10-CM

## 2024-04-25 PROCEDURE — 99024 POSTOP FOLLOW-UP VISIT: CPT | Mod: ,,, | Performed by: STUDENT IN AN ORGANIZED HEALTH CARE EDUCATION/TRAINING PROGRAM

## 2024-04-25 PROCEDURE — 99214 OFFICE O/P EST MOD 30 MIN: CPT | Mod: PBBFAC,PO,25 | Performed by: STUDENT IN AN ORGANIZED HEALTH CARE EDUCATION/TRAINING PROGRAM

## 2024-04-25 PROCEDURE — 11042 DBRDMT SUBQ TIS 1ST 20SQCM/<: CPT | Mod: PBBFAC,PO | Performed by: STUDENT IN AN ORGANIZED HEALTH CARE EDUCATION/TRAINING PROGRAM

## 2024-04-25 PROCEDURE — 99999 PR PBB SHADOW E&M-EST. PATIENT-LVL IV: CPT | Mod: PBBFAC,,, | Performed by: STUDENT IN AN ORGANIZED HEALTH CARE EDUCATION/TRAINING PROGRAM

## 2024-04-25 PROCEDURE — 99214 OFFICE O/P EST MOD 30 MIN: CPT | Mod: PBBFAC,25,27,PO | Performed by: STUDENT IN AN ORGANIZED HEALTH CARE EDUCATION/TRAINING PROGRAM

## 2024-04-25 PROCEDURE — 99499 UNLISTED E&M SERVICE: CPT | Mod: 25,S$PBB,, | Performed by: STUDENT IN AN ORGANIZED HEALTH CARE EDUCATION/TRAINING PROGRAM

## 2024-04-25 NOTE — PROGRESS NOTES
Subjective:      Patient ID: Nas Jenkins Jr. is a 73 y.o. male.    Chief Complaint: Wound Care    Mr. Jenkins presents with R heel wound for at least 2 weeks. He has no idea how the wound got there but it is painful. He has h/o of PAD requiring stents. He denies F/N/V/C.     04/25/2024  F/u R heel wound.     Review of Systems   Skin:         Ulcer   All other systems reviewed and are negative.          Objective:      Physical Exam  Cardiovascular:      Pulses:           Dorsalis pedis pulses are 0 on the right side.        Posterior tibial pulses are 0 on the right side.   Feet:      Right foot:      Skin integrity: Ulcer present.      Comments: Ulcer Plantar R foot measures 3.5x1.5x0.2cm. There is necrotic eschar at base and fibrogranular base deep to the eschar. No deep probing, fluctuance, crepitus, or surrounding cellulitis appreciated.     4/25/24:  3x1.5x0.2cm with granular base and hyperkeratotic margins. No infection.          Assessment:       Encounter Diagnoses   Name Primary?    Chronic pain in right foot Yes    Ulcer of right foot, unspecified ulcer stage     Necrotic eschar            Plan:       Nas was seen today for wound care.    Diagnoses and all orders for this visit:    Chronic pain in right foot    Ulcer of right foot, unspecified ulcer stage    Necrotic eschar    Other orders  -     Wound Debridement        I counseled the patient on his conditions, their implications and medical management.    Wound debrided today    HH ordered: [facility]  Please change dressing M and Th:    -Cleanse wounds with sterile saline or wound cleanser.  -Pat Dry.  -Apply  saline moistened christiano, kimberly foam  to wound.  -Cover with cast padding and coban with no compression .    Thank you.    F/u 2 weeks.    Wound Debridement    Date/Time: 4/25/2024 1:40 PM    Performed by: David Butcher DPM  Authorized by: David Butcher DPM    Consent Done?:  Yes (Verbal)    Wound Details:    Location:   Right foot    Location:  Right Heel    Type of Debridement:  Excisional       Length (cm):  3       Area (sq cm):  4.5       Width (cm):  1.5       Percent Debrided (%):  70       Depth (cm):  0.2       Total Area Debrided (sq cm):  3.15    Depth of debridement:  Subcutaneous tissue    Tissue debrided:  Subcutaneous    Devitalized tissue debrided:  Callus    Instruments:  Blade  Bleeding:  Minimal  Hemostasis Achieved: Yes  Method Used:  Pressure  Patient tolerance:  Patient tolerated the procedure well with no immediate complications  1st Wound Pain Assessment: 3

## 2024-04-30 ENCOUNTER — DOCUMENT SCAN (OUTPATIENT)
Dept: HOME HEALTH SERVICES | Facility: HOSPITAL | Age: 74
End: 2024-04-30
Payer: OTHER GOVERNMENT

## 2024-05-02 NOTE — PROGRESS NOTES
[place] [facility]  [facility]  History & Physical    SUBJECTIVE:     Chief Complaint:  Status post right lower extremity bypass for chronic limb-threatening ischemia      History of Present Illness:  Nas Jenkins Jr. is a 73 y.o. male with a complex history of HTN, CAD status post stents and peripheral arterial disease who initially presented to the hospital with a right heel ulcer.  His cardiologist Dr. Tate performed a bilateral lower extremity angiogram which illustrated chronic total occlusions of the bilateral SFA.    He was not so as post right lower extremity bypass with angioplasty of his right popliteal artery.  Today he presents for follow up and he was doing well with no complaints other than his right lower extremity swelling.  His wound is certainly getting smaller in size and he has been followed by wound care.      Review of patient's allergies indicates:  No Known Allergies     Past Medical History:   Diagnosis Date    Anticoagulant long-term use     Arthritis     BPH (benign prostatic hyperplasia)     Carpal tunnel syndrome     Colon adenoma     COPD (chronic obstructive pulmonary disease)     Coronary artery disease     2 stents    COVID 09/06/2022    Fibromyalgia     Fractures     GERD (gastroesophageal reflux disease)     History of MRSA infection     Hyperlipidemia     Hypertension     Lumbar disc disease     Male hypogonadism     PAD (peripheral artery disease)     2 stents in leg    PONV (postoperative nausea and vomiting)     PTSD (post-traumatic stress disorder)     Shortness of breath     Sleep apnea     No CPAP     Past Surgical History:   Procedure Laterality Date    ANGIOGRAM, ABDOMINAL AORTA W/ EXTREMITY RUNOFF  3/27/2024    Procedure: Abdominal w/Runoff (Cath selection Lt. Common Iliac artery);  Surgeon: Ferny Tate MD;  Location: [facility] CATH;  Service: Cardiology;;    BACK SURGERY      CATHETERIZATION, CORONARY ARTERY, WITH INSTANTANEOUS WAVE-FREE  RATIO DETERMINATION  10/11/2023    Procedure: (IFR) Diagonal;  Surgeon: Ferny Tate MD;  [facility]ATH;  Service: Cardiology;;    COLONOSCOPY N/A 6/7/2022    Procedure: COLONOSCOPY;  Surgeon: Ariel Dimas MD;  Location: [facility] ENDO;  Service: Endoscopy;  Laterality: N/A;    COLONOSCOPY N/A 8/10/2022    Procedure: COLONOSCOPY;  Surgeon: Sujit Henriquez MD;  Location: [facility] ENDO;  Service: Endoscopy;  Laterality: N/A;    CORONARY ANGIOGRAPHY N/A 8/26/2019    Procedure: ANGIOGRAM, CORONARY ARTERY;  Surgeon: Ferny Tate MD;  Location: [facility] CATH;  Service: Cardiology;  Laterality: N/A;    CORONARY ANGIOGRAPHY  10/11/2023    Procedure: Left heart cath;  Surgeon: Ferny Tate MD;  [facility];  Service: Cardiology;;    CORONARY ANGIOPLASTY WITH STENT PLACEMENT      CORONARY STENT PLACEMENT N/A 8/26/2019    Procedure: INSERTION, STENT, CORONARY ARTERY;  Surgeon: Ferny Tate MD;  Location: [facility] CATH;  Service: Cardiology;  Laterality: N/A;    CREATION OF FEMOROPOPLITEAL ARTERIAL BYPASS USING GRAFT Right 4/9/2024    Procedure: CREATION, BYPASS, ARTERIAL, FEMORAL TO POPLITEAL, USING GRAFT;  Surgeon: Gabo Fuller MD;  Location: [facility] OR;  Service: Vascular;  Laterality: Right;    ESOPHAGOGASTRODUODENOSCOPY N/A 6/7/2022    Procedure: EGD (ESOPHAGOGASTRODUODENOSCOPY);  Surgeon: Ariel Dimas MD;  Location: [facility] ENDO;  Service: Endoscopy;  Laterality: N/A;    INSTANTANEOUS WAVE-FREE RATIO (IFR)  10/11/2023    Procedure: (IFR) LAD;  Surgeon: Ferny Tate MD;  Location: [facility] CATH;  Service: Cardiology;;    NOSE SURGERY      PERCUTANEOUS TRANSLUMINAL ANGIOPLASTY (PTA) OF PERIPHERAL VESSEL      ROBOT-ASSISTED LAPAROSCOPIC COLECTOMY USING DA MANNY XI Right 10/6/2022    Procedure: XI ROBOTIC COLECTOMY -RIGHT - with ERAS;  Surgeon: Jsoe Mejia MD;  Location: [facility] OR;  Service: General;  Laterality: Right;  ATTEMPTED ROBOTIC: CONVERTED TO OPEN RIGHT HEMICOLECTOMY    ROBOT-ASSISTED  LAPAROSCOPIC LYSIS OF ADHESIONS USING DA MANNY XI N/A 10/6/2022    Procedure: XI ROBOTIC LYSIS, ADHESIONS;  Surgeon: Jose Mejia MD;  Location: [facility] OR;  Service: General;  Laterality: N/A;    ROBOT-ASSISTED LAPAROSCOPIC REPAIR OF INGUINAL HERNIA USING DA MANNY XI Bilateral 2/9/2022    Procedure: XI ROBOTIC REPAIR, HERNIA, INGUINAL;  Surgeon: Shade Gregory MD;  Location: [facility] OR;  Service: General;  Laterality: Bilateral;     Family History   Problem Relation Name Age of Onset    No Known Problems Mother      No Known Problems Father       Social History     Tobacco Use    Smoking status: Every Day     Current packs/day: 0.50     Average packs/day: 0.5 packs/day for 58.3 years (29.2 ttl pk-yrs)     Types: Cigarettes     Start date: 1966    Smokeless tobacco: Never   Substance Use Topics    Alcohol use: Not Currently    Drug use: Never        Review of Systems:  Review of Systems   All other systems reviewed and are negative.      OBJECTIVE:     Vital Signs (Most Recent):  Pulse: 67 (04/25/24 1427)  BP: 138/80 (04/25/24 1427)    Physical Exam:  Physical Exam   Constitutional: He is oriented to person, place, and time.  Non-toxic appearance. No distress.   Cardiovascular:   Palpable DP and PT Pulmonary:      Effort: Pulmonary effort is normal. No respiratory distress.      Breath sounds: No wheezing.     Musculoskeletal:      Comments: Right lower extremity incisions clean dry and intact with stable   Neurological: He is alert and oriented to person, place, and time.   Psychiatric: His behavior is normal. Mood normal.   Vitals reviewed.      Laboratory:  Lab Results   Component Value Date    WBC 10.75 04/11/2024    HGB 9.6 (L) 04/11/2024    HCT 28.4 (L) 04/11/2024     04/11/2024    CHOL 176 07/30/2021    TRIG 255 (H) 07/30/2021    HDL 53 07/30/2021    ALT 15 04/12/2024    AST 27 04/12/2024     04/12/2024    K 3.8 04/12/2024     04/11/2024    CREATININE 0.81 (L) 04/12/2024    BUN 18  04/12/2024    CO2 28 04/12/2024    TSH 1.620 07/30/2021    INR 0.9 10/04/2022    HGBA1C 5.4 03/31/2024             ASSESSMENT/PLAN:       He was doing well status post right lower extremity fem-pop bypass with balloon angioplasty of his popliteal artery.  His heel ulcer is getting substantially smaller.    He was having some reperfusion edema which I advised him to his leg elevated when possible as well as obtaining some compression stockings.  This is not uncommon after revascularize and a chronic total occlusion.    We will have him follow up in 2 weeks for a right lower extremity arterial ultrasound to initiate surveillance of his bypass graft.    He was also having issues with his left leg and has a known  of his left SFA.  We will let him continue to recover some more from his recent bypassing for a dressing in his left.    Like for him to continue Plavix  and Sagrarioquis          Gabo Fuller M.D.   [facility]

## 2024-05-06 ENCOUNTER — TELEPHONE (OUTPATIENT)
Dept: VASCULAR SURGERY | Facility: CLINIC | Age: 74
End: 2024-05-06
Payer: OTHER GOVERNMENT

## 2024-05-06 NOTE — TELEPHONE ENCOUNTER
Per Dr Fuller:  He has reperfusion  edema, which is not uncommon after bypass if they were previously completely occluded before bypass.  Last I saw him I advised him to keep his leg elevated as much as possible and to get compression stockings. Can take 2-3mths to resolve, especially if not completely compliant.     Informed pt's HH nurse - she will give the pt the information.     ----- Message from Katia Hogan sent at 5/6/2024  3:00 PM CDT -----  Regarding: advise  Contact: home nurse  Type: Needs Medical Advice  Who Called:  home nurse   Symptoms (please be specific):    How long has patient had these symptoms:    Pharmacy name and phone #:   Best Call Back Number:nurse [phone]   Additional Information: seen this morning; incision right leg redness and increase in swelling warm to touch

## 2024-05-08 ENCOUNTER — TELEPHONE (OUTPATIENT)
Dept: VASCULAR SURGERY | Facility: CLINIC | Age: 74
End: 2024-05-08
Payer: OTHER GOVERNMENT

## 2024-05-08 NOTE — TELEPHONE ENCOUNTER
Attempted to r/c. No answer, msg left.    ----- Message from Irene Muñoz sent at 5/8/2024  1:43 PM CDT -----  Contact: wife michell  Type:  Needs Medical Advice    Who Called: wife  Symptoms (please be specific): pt needs to be seen asap.. wife wanted him to be seen tomorrow if possible, Pt has another appt with  Pt  has a fever. Keeps giving him medicine but not working  Would the patient rather a call back or a response via [facility]? Call or portal   Best Call Back Number:  [phone]  Additional Information: please advise and thank you.

## 2024-05-08 NOTE — TELEPHONE ENCOUNTER
Pt wife stated his fever has been running a low grade temp in 100s. Advised to continue taking tylenol for fever and to report to urgent care if sxs worsen. She questioned when next appt w/ Dr. MORAN Stated it is on 5/16. Pt spouse verbalized understanding.     ----- Message from Alyce Muhammad sent at 5/8/2024  3:21 PM CDT -----  Contact: wife  Type:  Patient Returning Call    Who Called:  wife  Who Left Message for Patient:  Pamella  Does the patient know what this is regarding?:  yes  Best Call Back Number:  [phone]    Wife states she is going in to ultra sound and will be back in 30 min.

## 2024-05-09 ENCOUNTER — OFFICE VISIT (OUTPATIENT)
Dept: PODIATRY | Facility: CLINIC | Age: 74
End: 2024-05-09
Payer: MEDICARE

## 2024-05-09 VITALS — BODY MASS INDEX: 15.78 KG/M2 | WEIGHT: 119.06 LBS | HEIGHT: 73 IN

## 2024-05-09 DIAGNOSIS — I73.9 PAD (PERIPHERAL ARTERY DISEASE): Primary | ICD-10-CM

## 2024-05-09 DIAGNOSIS — L97.519 ULCER OF RIGHT FOOT, UNSPECIFIED ULCER STAGE: ICD-10-CM

## 2024-05-09 PROCEDURE — 99999 PR PBB SHADOW E&M-EST. PATIENT-LVL IV: CPT | Mod: PBBFAC,,, | Performed by: STUDENT IN AN ORGANIZED HEALTH CARE EDUCATION/TRAINING PROGRAM

## 2024-05-09 PROCEDURE — 11721 DEBRIDE NAIL 6 OR MORE: CPT | Mod: PBBFAC,PO | Performed by: STUDENT IN AN ORGANIZED HEALTH CARE EDUCATION/TRAINING PROGRAM

## 2024-05-09 PROCEDURE — 99214 OFFICE O/P EST MOD 30 MIN: CPT | Mod: PBBFAC,PO | Performed by: STUDENT IN AN ORGANIZED HEALTH CARE EDUCATION/TRAINING PROGRAM

## 2024-05-09 PROCEDURE — 97597 DBRDMT OPN WND 1ST 20 CM/<: CPT | Mod: 59,PBBFAC,PO | Performed by: STUDENT IN AN ORGANIZED HEALTH CARE EDUCATION/TRAINING PROGRAM

## 2024-05-09 NOTE — PROGRESS NOTES
"Subjective:      Patient ID: Nas Jenkins Jr. is a 73 y.o. male.    Chief Complaint: Wound Care    Mr. Jenkins presents with R heel wound for at least 2 weeks. He has no idea how the wound got there but it is painful. He has h/o of PAD requiring stents. He denies F/N/V/C.     05/09/2024  F/u R heel wound.     Review of Systems   Skin:         Ulcer   All other systems reviewed and are negative.          Objective:      Physical Exam  Cardiovascular:      Pulses:           Dorsalis pedis pulses are 0 on the right side.        Posterior tibial pulses are 0 on the right side.   Feet:      Right foot:      Skin integrity: Ulcer present.      Comments: Ulcer Plantar R foot measures 3.5x1.5x0.2cm. There is necrotic eschar at base and fibrogranular base deep to the eschar. No deep probing, fluctuance, crepitus, or surrounding cellulitis appreciated.     4/25/24:  3x1.5x0.2cm with granular base and hyperkeratotic margins. No infection.    5/9/24:  [id number]          Assessment:       Encounter Diagnoses   Name Primary?    PAD (peripheral artery disease) Yes    Ulcer of right foot, unspecified ulcer stage              Plan:       Nas was seen today for wound care.    Diagnoses and all orders for this visit:    PAD (peripheral artery disease)    Ulcer of right foot, unspecified ulcer stage          I counseled the patient on his conditions, their implications and medical management.    He is healing very well.     Continue local wound care.    F/u 2 weeks.    Wound Debridement    Date/Time: 5/9/2024 4:20 PM    Performed by: David Butcher DPM  Authorized by: David Butcher DPM    Time out: Immediately prior to procedure a "time out" was called to verify the correct patient, procedure, equipment, support staff and site/side marked as required.    Consent Done?:  Yes (Verbal)    Wound Details:    Location:  Right foot    Location:  Right Heel    Type of Debridement:  Excisional       Length (cm):  0.3       Area (sq " cm):  0.09       Width (cm):  0.3       Percent Debrided (%):  100       Depth (cm):  0.1       Total Area Debrided (sq cm):  0.09    Depth of debridement:  Epidermis/Dermis    Tissue debrided:  Dermis and Epidermis    Devitalized tissue debrided:  Callus    Instruments:  Curette  Bleeding:  None  Patient tolerance:  Patient tolerated the procedure well with no immediate complications  1st Wound Pain Assessment: 1  Routine Foot Care    Date/Time: 5/9/2024 4:20 PM    Performed by: David Butcher DPM  Authorized by: David Butcher DPM    Consent Done?:  Yes (Verbal)  Hyperkeratotic Skin Lesions?: Yes    Number of trimmed lesions:  4  Location(s):  Left 2nd Toe, Left 3rd Toe, Right 3rd Toe and Right 2nd Toe    Nail Care Type:  Debride  Location(s): All  (Left 1st Toe, Left 3rd Toe, Left 2nd Toe, Left 4th Toe, Left 5th Toe, Right 1st Toe, Right 2nd Toe, Right 3rd Toe, Right 4th Toe and Right 5th Toe)  Patient tolerance:  Patient tolerated the procedure well with no immediate complications

## 2024-05-10 ENCOUNTER — EXTERNAL HOME HEALTH (OUTPATIENT)
Dept: HOME HEALTH SERVICES | Facility: HOSPITAL | Age: 74
End: 2024-05-10
Payer: OTHER GOVERNMENT

## 2024-05-14 ENCOUNTER — HOSPITAL ENCOUNTER (OUTPATIENT)
Dept: CARDIOLOGY | Facility: HOSPITAL | Age: 74
Discharge: HOME | End: 2024-05-14
Attending: STUDENT IN AN ORGANIZED HEALTH CARE EDUCATION/TRAINING PROGRAM
Payer: MEDICARE

## 2024-05-14 DIAGNOSIS — M79.671 RIGHT FOOT PAIN: ICD-10-CM

## 2024-05-14 LAB
OHS CV RIGHT ABI LOWER EXTREMITY (NO CALC): 0.6
OHS CV US RIGHT COMMON ILIAC PSV: 177 CM/S
RIGHT ANT TIBIAL SYS PSV: 69 CM/S
RIGHT CFA PSV: 195 CM/S
RIGHT DIST ANA PSV: 43 CM/S
RIGHT DIST GRAFT PSV: 44 CM/S
RIGHT EXTERNAL ILLIAC PSV: 469 CM/S
RIGHT INFLOW PSV: 195 CM/S
RIGHT MID GRAFT PSV: 50 CM/S
RIGHT OUTFLOW PSV: 42 CM/S
RIGHT PERONEAL SYS PSV: 49 CM/S
RIGHT POST TIBIAL SYS PSV: 55 CM/S
RIGHT PROX ANA PSV: 121 CM/S
RIGHT PROX GRAFT PSV: 67 CM/S
RIGHT TIB/PER TRUNK SYS PSV: 81 CM/S

## 2024-05-14 PROCEDURE — 93926 LOWER EXTREMITY STUDY: CPT | Mod: PO,RT

## 2024-05-16 ENCOUNTER — OFFICE VISIT (OUTPATIENT)
Dept: VASCULAR SURGERY | Facility: CLINIC | Age: 74
End: 2024-05-16
Payer: MEDICARE

## 2024-05-16 VITALS
SYSTOLIC BLOOD PRESSURE: 122 MMHG | DIASTOLIC BLOOD PRESSURE: 76 MMHG | HEART RATE: 70 BPM | HEIGHT: 73 IN | WEIGHT: 119.06 LBS | BODY MASS INDEX: 15.78 KG/M2

## 2024-05-16 DIAGNOSIS — I73.9 PAD (PERIPHERAL ARTERY DISEASE): Primary | ICD-10-CM

## 2024-05-16 PROCEDURE — 99999 PR PBB SHADOW E&M-EST. PATIENT-LVL IV: CPT | Mod: PBBFAC,,, | Performed by: STUDENT IN AN ORGANIZED HEALTH CARE EDUCATION/TRAINING PROGRAM

## 2024-05-16 PROCEDURE — 99214 OFFICE O/P EST MOD 30 MIN: CPT | Mod: PBBFAC,PO | Performed by: STUDENT IN AN ORGANIZED HEALTH CARE EDUCATION/TRAINING PROGRAM

## 2024-05-16 PROCEDURE — 99024 POSTOP FOLLOW-UP VISIT: CPT | Mod: ,,, | Performed by: STUDENT IN AN ORGANIZED HEALTH CARE EDUCATION/TRAINING PROGRAM

## 2024-05-16 NOTE — PROGRESS NOTES
[place] [facility]  [facility]  Follow-up Visit    Nas Jenkins Jr. is a 73 y.o. male who is following up s/p R fem-po with PTFE for RLE CLTI with heel wound.     The heel wound has gotten substantially smaller. He does continue to have swelling in his RLE  but has gotten better.    Medications and Allergies:  Reviewed and updated today.    Vitals:  Vitals:    05/16/24 1000   BP: 122/76   Pulse: 70          Physical Exam  Unable to assess pedal pulses with foot dressing  Incision in right groin and right medial thigh is well healed.     Plan:  He was advised to continue to keep his leg elevated into her compression stockings if possible for his reperfusion edema.    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 We will plan for a RLE angiogram with intent to treat the R EIA.    Cont asa and plavix    MD Jonny  Vascular Surgery

## 2024-05-17 ENCOUNTER — DOCUMENT SCAN (OUTPATIENT)
Dept: HOME HEALTH SERVICES | Facility: HOSPITAL | Age: 74
End: 2024-05-17
Payer: OTHER GOVERNMENT

## 2024-05-21 ENCOUNTER — TELEPHONE (OUTPATIENT)
Dept: VASCULAR SURGERY | Facility: CLINIC | Age: 74
End: 2024-05-21
Payer: OTHER GOVERNMENT

## 2024-05-21 ENCOUNTER — OFFICE VISIT (OUTPATIENT)
Dept: PODIATRY | Facility: CLINIC | Age: 74
End: 2024-05-21
Payer: MEDICARE

## 2024-05-21 VITALS — WEIGHT: 119.06 LBS | BODY MASS INDEX: 15.78 KG/M2 | HEIGHT: 73 IN

## 2024-05-21 DIAGNOSIS — I73.9 PAD (PERIPHERAL ARTERY DISEASE): Primary | ICD-10-CM

## 2024-05-21 DIAGNOSIS — Z87.2 HEALED ULCER OF FOOT ON EXAMINATION: ICD-10-CM

## 2024-05-21 DIAGNOSIS — I25.10 CAD (CORONARY ARTERY DISEASE): ICD-10-CM

## 2024-05-21 PROCEDURE — 99999 PR PBB SHADOW E&M-EST. PATIENT-LVL IV: CPT | Mod: PBBFAC,,, | Performed by: STUDENT IN AN ORGANIZED HEALTH CARE EDUCATION/TRAINING PROGRAM

## 2024-05-21 PROCEDURE — 99214 OFFICE O/P EST MOD 30 MIN: CPT | Mod: PBBFAC,PO | Performed by: STUDENT IN AN ORGANIZED HEALTH CARE EDUCATION/TRAINING PROGRAM

## 2024-05-21 RX ORDER — CEFAZOLIN SODIUM 2 G/50ML
2 SOLUTION INTRAVENOUS
Status: CANCELLED | OUTPATIENT
Start: 2024-05-21

## 2024-05-21 RX ORDER — LIDOCAINE HYDROCHLORIDE 10 MG/ML
1 INJECTION, SOLUTION EPIDURAL; INFILTRATION; INTRACAUDAL; PERINEURAL ONCE
Status: CANCELLED | OUTPATIENT
Start: 2024-05-21 | End: 2024-05-21

## 2024-05-21 NOTE — PROGRESS NOTES
Subjective:      Patient ID: Nas Jenkins Jr. is a 73 y.o. male.    Chief Complaint: Wound Care    Mr. Jenkins presents with R heel wound for at least 2 weeks. He has no idea how the wound got there but it is painful. He has h/o of PAD requiring stents. He denies F/N/V/C.     05/21/2024  F/u R heel wound. No pain today.     Review of Systems   Skin:         Ulcer   All other systems reviewed and are negative.          Objective:      Physical Exam  Cardiovascular:      Pulses:           Dorsalis pedis pulses are 0 on the right side.        Posterior tibial pulses are 0 on the right side.   Feet:      Right foot:      Skin integrity: Ulcer present.      Comments: Ulcer Plantar R foot measures 3.5x1.5x0.2cm. There is necrotic eschar at base and fibrogranular base deep to the eschar. No deep probing, fluctuance, crepitus, or surrounding cellulitis appreciated.     4/25/24:  3x1.5x0.2cm with granular base and hyperkeratotic margins. No infection.    5/9/24:  [id number]    5/21/24:  Fully epithelialized.         Assessment:       Encounter Diagnoses   Name Primary?    PAD (peripheral artery disease) Yes    Healed ulcer of foot on examination                Plan:       Nas was seen today for wound care.    Diagnoses and all orders for this visit:    PAD (peripheral artery disease)    Healed ulcer of foot on examination            I counseled the patient on his conditions, their implications and medical management.    Wound is fully epithelialized.    Start to shower the foot. Hold off on creams and soaking for another week.    F/u as needed.    David Butcher DPM

## 2024-05-22 ENCOUNTER — TELEPHONE (OUTPATIENT)
Dept: PODIATRY | Facility: CLINIC | Age: 74
End: 2024-05-22
Payer: OTHER GOVERNMENT

## 2024-05-22 NOTE — TELEPHONE ENCOUNTER
----- Message from Damien Schultz sent at 5/22/2024  2:33 PM CDT -----  Regarding:  Nurse  Contact: stefan at [phone]  Type: Needs Medical Advice    Who Called:  stefan with pulse       Best Call Back Number: [phone]    Additional Information:  Orders in EPIC need to be signed.

## 2024-05-29 ENCOUNTER — DOCUMENT SCAN (OUTPATIENT)
Dept: HOME HEALTH SERVICES | Facility: HOSPITAL | Age: 74
End: 2024-05-29
Payer: OTHER GOVERNMENT

## 2024-06-23 ENCOUNTER — DOCUMENT SCAN (OUTPATIENT)
Dept: HOME HEALTH SERVICES | Facility: HOSPITAL | Age: 74
End: 2024-06-23
Payer: OTHER GOVERNMENT

## 2024-06-25 ENCOUNTER — TELEPHONE (OUTPATIENT)
Dept: VASCULAR SURGERY | Facility: CLINIC | Age: 74
End: 2024-06-25
Payer: OTHER GOVERNMENT

## 2024-06-25 NOTE — TELEPHONE ENCOUNTER
Spoke w/ Luis Alberto. Correct fax number was given- [phone].     ----- Message from Kiley Montalvo sent at 6/25/2024 10:12 AM CDT -----  Type: Needs Medical Advice  Who Called:  Luis Alberto  Best Call Back Number: [phone]  Additional Information: Luis Alberto is calling in regards to a medical necessity form that was faxed yesterday. Needs to make sure the office received the form. Needs to speak to a nurse to confirm. Please call back and advise. Thanks!

## 2024-07-25 ENCOUNTER — OFFICE VISIT (OUTPATIENT)
Dept: VASCULAR SURGERY | Facility: CLINIC | Age: 74
End: 2024-07-25
Payer: OTHER GOVERNMENT

## 2024-07-25 VITALS
SYSTOLIC BLOOD PRESSURE: 153 MMHG | WEIGHT: 141.13 LBS | HEIGHT: 73 IN | HEART RATE: 70 BPM | DIASTOLIC BLOOD PRESSURE: 86 MMHG | BODY MASS INDEX: 18.71 KG/M2

## 2024-07-25 DIAGNOSIS — I73.9 PAD (PERIPHERAL ARTERY DISEASE): Primary | ICD-10-CM

## 2024-07-25 PROCEDURE — 99214 OFFICE O/P EST MOD 30 MIN: CPT | Mod: PBBFAC,PO | Performed by: STUDENT IN AN ORGANIZED HEALTH CARE EDUCATION/TRAINING PROGRAM

## 2024-07-25 PROCEDURE — 99999 PR PBB SHADOW E&M-EST. PATIENT-LVL IV: CPT | Mod: PBBFAC,,, | Performed by: STUDENT IN AN ORGANIZED HEALTH CARE EDUCATION/TRAINING PROGRAM

## 2024-07-25 NOTE — PROGRESS NOTES
[place] - [facility]  [facility]  Follow-up Visit    Nas Jenkins Jr. is a 73 y.o. male who is following up for  right lower extremity bypass    With a wound on his right foot is completely healed at this point.  His only complaint is right lower extremity swelling.  The swelling has been slowly getting better but it does bother him at times.    Medications and Allergies:  Reviewed and updated today.    Vitals:  Vitals:    07/25/24 1327   BP: (!) 153/86   Pulse: 70          Physical Exam  Mild swelling in his right lower extremity.  Difficult to palpate distal pulses with the swelling.    Plan:  We will plan for a right lower extremity arterial ultrasound in 6 months to survey his fem-pop bypass to ensure there was no areas of stenosis.     Plan to continue his Plavix and aspirin    I did offer him a right lower extremity venous reflux study to see if he was venous insufficiency that is contributing to his right lower extremity swelling.  He would like to hold off on this ultrasound.    MD Jonny  Vascular Surgery

## 2024-09-16 ENCOUNTER — TELEPHONE (OUTPATIENT)
Dept: PODIATRY | Facility: CLINIC | Age: 74
End: 2024-09-16
Payer: OTHER GOVERNMENT

## 2024-10-22 ENCOUNTER — TELEPHONE (OUTPATIENT)
Dept: VASCULAR SURGERY | Facility: CLINIC | Age: 74
End: 2024-10-22
Payer: OTHER GOVERNMENT

## 2024-10-22 NOTE — TELEPHONE ENCOUNTER
LVM requesting callback to discuss questions.     ----- Message from [employer] sent at 10/22/2024  2:31 PM CDT -----  Type:  Needs Medical Advice    Who Called: the patient  Symptoms (please be specific):  How long has patient had these symptoms:    Pharmacy name and phone #:    Would the patient rather a call back or a response via [facility]? call back/  Best Call Back Number: [phone]   Additional Information: Pt states she would like [facility] and expiration date to file a continuation of care  Thanks

## 2024-10-24 ENCOUNTER — TELEPHONE (OUTPATIENT)
Dept: VASCULAR SURGERY | Facility: CLINIC | Age: 74
End: 2024-10-24
Payer: OTHER GOVERNMENT

## 2024-10-24 DIAGNOSIS — I73.9 PAD (PERIPHERAL ARTERY DISEASE): Primary | ICD-10-CM

## 2024-10-24 NOTE — TELEPHONE ENCOUNTER
Called pt to schedule R Leg US and FU, directions given and pt verbalized understanding    ----- Message from ALIN Preciado sent at 10/24/2024 12:41 PM CDT -----  Regarding: FW: Sooner Appt  Can you kamran him for rt leg u/s and f/u for next avail -if he pain he can go to ED  ----- Message -----  From: Brandon Guzman  Sent: 10/24/2024  12:19 PM CDT  To: [facility] Staff  Subject: Sooner Appt                                      Type:  Sooner Appointment Request    Caller is requesting a sooner appointment.  Caller declined first available appointment listed below.  Caller will not accept being placed on the waitlist and is requesting a message be sent to doctor.    Name of Caller:Wife Roxann     When is the first available appointment?1/2/25    Symptoms:issues with legs several issues since procedure./    Would the patient rather a call back or a response via [facility]? Call back    Best Call Back Number:[phone]      Additional Information:  Please advise -- Thank you

## 2024-11-18 ENCOUNTER — HOSPITAL ENCOUNTER (OUTPATIENT)
Dept: RADIOLOGY | Facility: HOSPITAL | Age: 74
Discharge: HOME | End: 2024-11-18
Attending: STUDENT IN AN ORGANIZED HEALTH CARE EDUCATION/TRAINING PROGRAM
Payer: OTHER GOVERNMENT

## 2024-11-18 ENCOUNTER — OFFICE VISIT (OUTPATIENT)
Dept: PODIATRY | Facility: CLINIC | Age: 74
End: 2024-11-18
Payer: OTHER GOVERNMENT

## 2024-11-18 VITALS — BODY MASS INDEX: 18.71 KG/M2 | WEIGHT: 141.13 LBS | HEIGHT: 73 IN

## 2024-11-18 DIAGNOSIS — I73.9 PAD (PERIPHERAL ARTERY DISEASE): Primary | ICD-10-CM

## 2024-11-18 DIAGNOSIS — Z87.2 HEALED ULCER OF FOOT ON EXAMINATION: ICD-10-CM

## 2024-11-18 DIAGNOSIS — I73.9 PAD (PERIPHERAL ARTERY DISEASE): ICD-10-CM

## 2024-11-18 DIAGNOSIS — M79.2 NEUROPATHIC PAIN, LEG, RIGHT: ICD-10-CM

## 2024-11-18 PROCEDURE — 93922 UPR/L XTREMITY ART 2 LEVELS: CPT | Mod: 26,52,, | Performed by: RADIOLOGY

## 2024-11-18 PROCEDURE — 99213 OFFICE O/P EST LOW 20 MIN: CPT | Mod: S$PBB,,, | Performed by: STUDENT IN AN ORGANIZED HEALTH CARE EDUCATION/TRAINING PROGRAM

## 2024-11-18 PROCEDURE — 99999 PR PBB SHADOW E&M-EST. PATIENT-LVL IV: CPT | Mod: PBBFAC,,, | Performed by: STUDENT IN AN ORGANIZED HEALTH CARE EDUCATION/TRAINING PROGRAM

## 2024-11-18 PROCEDURE — 99214 OFFICE O/P EST MOD 30 MIN: CPT | Mod: PBBFAC,25,PO | Performed by: STUDENT IN AN ORGANIZED HEALTH CARE EDUCATION/TRAINING PROGRAM

## 2024-11-18 PROCEDURE — 93922 UPR/L XTREMITY ART 2 LEVELS: CPT | Mod: TC,52,PO

## 2024-11-18 PROCEDURE — 93926 LOWER EXTREMITY STUDY: CPT | Mod: 26,RT,, | Performed by: RADIOLOGY

## 2024-11-18 RX ORDER — LIDOCAINE AND PRILOCAINE 25; 25 MG/G; MG/G
CREAM TOPICAL
Qty: 60 G | Refills: 11 | Status: SHIPPED | OUTPATIENT
Start: 2024-11-18 | End: 2025-11-18

## 2024-11-18 RX ORDER — LIDOCAINE 50 MG/G
1 PATCH TOPICAL DAILY
Qty: 90 PATCH | Refills: 1 | Status: SHIPPED | OUTPATIENT
Start: 2024-11-18 | End: 2025-05-17

## 2024-11-18 NOTE — PROGRESS NOTES
Subjective:      Patient ID: Nas Jenkins Jr. is a 74 y.o. male.    Chief Complaint: Wound Care (Right foot )    Mr. Jenkins presents with R heel wound for at least 2 weeks. He has no idea how the wound got there but it is painful. He has h/o of PAD requiring stents. He denies F/N/V/C.     11/18/2024  F/u R healed heal wound. Doing well overall and just had an ultrasound today to monitor blood flow. He does note some neuropathic pain from the knee down.    Review of Systems   Neurological:  Positive for paresthesias.   All other systems reviewed and are negative.          Objective:      Physical Exam  Cardiovascular:      Pulses:           Dorsalis pedis pulses are 0 on the right side.        Posterior tibial pulses are 0 on the right side.   Feet:      Right foot:      Skin integrity: No ulcer.      Comments: + tinel's at the CPN, + provocation of the saphenous nerve.           Assessment:       Encounter Diagnoses   Name Primary?    PAD (peripheral artery disease) Yes    Healed ulcer of foot on examination     Neuropathic pain, leg, right                Plan:       Nas was seen today for wound care.    Diagnoses and all orders for this visit:    PAD (peripheral artery disease)    Healed ulcer of foot on examination    Neuropathic pain, leg, right    Other orders  -     LIDOcaine-prilocaine (EMLA) cream; Apply topically as needed (pain).  -     LIDOcaine (LIDODERM) 5 %; Place 1 patch onto the skin once daily. Remove & Discard patch within 12 hours or as directed by MD        I counseled the patient on his conditions, their implications and medical management.    EMLA and lidopatches for pain.     We can monitor nerve pain     David Butcher DPM

## 2024-11-21 ENCOUNTER — TELEPHONE (OUTPATIENT)
Dept: PODIATRY | Facility: CLINIC | Age: 74
End: 2024-11-21
Payer: OTHER GOVERNMENT

## 2024-11-21 ENCOUNTER — OFFICE VISIT (OUTPATIENT)
Dept: VASCULAR SURGERY | Facility: CLINIC | Age: 74
End: 2024-11-21
Payer: OTHER GOVERNMENT

## 2024-11-21 VITALS
SYSTOLIC BLOOD PRESSURE: 164 MMHG | HEART RATE: 55 BPM | BODY MASS INDEX: 19.46 KG/M2 | DIASTOLIC BLOOD PRESSURE: 88 MMHG | WEIGHT: 146.81 LBS | HEIGHT: 73 IN

## 2024-11-21 DIAGNOSIS — I73.9 PAD (PERIPHERAL ARTERY DISEASE): Primary | ICD-10-CM

## 2024-11-21 PROCEDURE — 99999 PR PBB SHADOW E&M-EST. PATIENT-LVL IV: CPT | Mod: PBBFAC,,, | Performed by: STUDENT IN AN ORGANIZED HEALTH CARE EDUCATION/TRAINING PROGRAM

## 2024-11-21 PROCEDURE — 99214 OFFICE O/P EST MOD 30 MIN: CPT | Mod: PBBFAC,PO | Performed by: STUDENT IN AN ORGANIZED HEALTH CARE EDUCATION/TRAINING PROGRAM

## 2024-11-21 NOTE — TELEPHONE ENCOUNTER
----- Message from Phoebe sent at 11/21/2024  3:48 PM CST -----  Type:  Needs Medical Advice    Who Called: Federica - wife    Symptoms (please be specific): na     How long has patient had these symptoms:  pankaj    Pharmacy name and phone #:  na    Would the patient rather a call back or a response via [facility]? Call back    Best Call Back Number: [phone]      Additional Information: the [facility] told the pts wife that Dr Butcher needs to be sending a fax with updated info every time he goes to the Dr they need those medical notes from that visit. She has the fax number at home and can give it to someone when they call her back      Please call Back to advise. Thanks!

## 2024-11-21 NOTE — TELEPHONE ENCOUNTER
Spoke with the patient's wife concerning a message sent regarding sending the patient's information to the [facility]. Patient stated that she was driving. I suggested that she calls back tomorrow with the necessary date and fax number so that we can send the information to the [facility]. She expressed understanding of the message.

## 2024-11-22 ENCOUNTER — PATIENT MESSAGE (OUTPATIENT)
Dept: PODIATRY | Facility: CLINIC | Age: 74
End: 2024-11-22
Payer: OTHER GOVERNMENT

## 2024-11-22 DIAGNOSIS — I73.9 PAD (PERIPHERAL ARTERY DISEASE): Primary | ICD-10-CM

## 2025-02-04 ENCOUNTER — TELEPHONE (OUTPATIENT)
Dept: VASCULAR SURGERY | Facility: CLINIC | Age: 75
End: 2025-02-04
Payer: OTHER GOVERNMENT

## 2025-02-04 NOTE — TELEPHONE ENCOUNTER
"Spoke w/ pt wife.   Per Dr Fuller:  "Probably ER. Sounds like GI issues. not sure abdominal pain with eating is absolutely vascular."  Pt verbalizing understanding.  "

## 2025-02-04 NOTE — TELEPHONE ENCOUNTER
Pt wife called- abd pain QD pain 7/10. Eating one meal out of the day. Having BM every couple of days. Tried referring her to primary said primary told her to contact our office. Secure msg sent to MD for recommendations.      ----- Message from Lazara sent at 2/4/2025 11:39 AM CST -----  Type:  Needs Medical Advice    Who Called: Spouse Ignacia  Symptoms (please be specific):post op   How long has patient had these symptoms:    Pharmacy name and phone #:    Would the patient rather a call back or a response via [facility]? call back/  Best Call Back Number: [phone]   Additional Information: Spouse would like to speak to staff in reference to pt symptoms  Thanks

## 2025-02-26 ENCOUNTER — TELEPHONE (OUTPATIENT)
Dept: PODIATRY | Facility: CLINIC | Age: 75
End: 2025-02-26
Payer: OTHER GOVERNMENT

## 2025-02-26 NOTE — TELEPHONE ENCOUNTER
----- Message from Bre sent at 2/26/2025  1:13 PM CST -----  Type: Needs Medical AdviceWho Called:  pt's wife, michell Symptoms (please be specific):  How long has patient had these symptoms:  Pharmacy name and phone #:  [id number] - [place], [place] - [address] [address]Zfqkr: [phone] Fax: [phone] [place] [place] [facility] [address] [place] [address]Cgxdc: [phone] Fax: [phone] [address] [place] [address]Drleg: [phone] Fax: [phone] Best Call Back Number: [phone] Information: Needs a PA for the [facility] to get care est. With new provider, pt used to see DR. Butcher. Please call back to advise. Thanks!

## 2025-05-27 ENCOUNTER — HOSPITAL ENCOUNTER (OUTPATIENT)
Dept: RADIOLOGY | Facility: HOSPITAL | Age: 75
Discharge: HOME | End: 2025-05-27
Attending: STUDENT IN AN ORGANIZED HEALTH CARE EDUCATION/TRAINING PROGRAM
Payer: OTHER GOVERNMENT

## 2025-06-09 ENCOUNTER — TELEPHONE (OUTPATIENT)
Facility: CLINIC | Age: 75
End: 2025-06-09
Payer: OTHER GOVERNMENT

## 2025-06-09 NOTE — TELEPHONE ENCOUNTER
[facility] referral faxed to [phone].Attempted to return call to spouse. No answer, detailed msg left.

## 2025-07-24 ENCOUNTER — TELEPHONE (OUTPATIENT)
Facility: CLINIC | Age: 75
End: 2025-07-24
Payer: OTHER GOVERNMENT

## 2025-07-24 DIAGNOSIS — I73.9 PAD (PERIPHERAL ARTERY DISEASE): Primary | ICD-10-CM

## 2025-07-30 ENCOUNTER — HOSPITAL ENCOUNTER (OUTPATIENT)
Dept: CARDIOLOGY | Facility: HOSPITAL | Age: 75
Discharge: HOME | End: 2025-07-30
Attending: STUDENT IN AN ORGANIZED HEALTH CARE EDUCATION/TRAINING PROGRAM
Payer: OTHER GOVERNMENT

## 2025-07-30 DIAGNOSIS — I73.9 PAD (PERIPHERAL ARTERY DISEASE): ICD-10-CM

## 2025-07-30 PROCEDURE — 93925 LOWER EXTREMITY STUDY: CPT | Mod: PO

## 2025-07-31 LAB
LEFT ANT TIBIAL SYS PSV: 9 CM/S
LEFT CFA PSV: 149 CM/S
LEFT PERONEAL SYS PSV: 11 CM/S
LEFT POPLITEAL PSV: 43 CM/S
LEFT POST TIBIAL SYS PSV: 20 CM/S
LEFT PROFUNDA SYS PSV: 48 CM/S
LEFT SUPER FEMORAL DIST SYS PSV: 17 CM/S
LEFT SUPER FEMORAL MID SYS PSV: 63 CM/S
LEFT TIB/PER TRUNK SYS PSV: 37 CM/S
OHS CV LEFT LOWER EXTREMITY ABI (NO CALC): 0.67
OHS CV LOWER EXTREMITY GRAFT MEASUREMENTS - RIGHT - G1 - D: 24
OHS CV LOWER EXTREMITY GRAFT MEASUREMENTS - RIGHT - G1 - DA: 37
OHS CV LOWER EXTREMITY GRAFT MEASUREMENTS - RIGHT - G1 - M: 41
OHS CV LOWER EXTREMITY GRAFT MEASUREMENTS - RIGHT - G1 - P: 36
OHS CV LOWER EXTREMITY GRAFT MEASUREMENTS - RIGHT - G1 - PA: 59
OHS CV RIGHT ABI LOWER EXTREMITY (NO CALC): 1
RIGHT ANT TIBIAL SYS PSV: 17 CM/S
RIGHT CFA PSV: 172 CM/S
RIGHT PERONEAL SYS PSV: 25 CM/S
RIGHT POPLITEAL PSV: 58 CM/S
RIGHT POST TIBIAL SYS PSV: 37 CM/S
RIGHT PROFUNDA SYS PSV: 136 CM/S
RIGHT TIB/PER TRUNK SYS PSV: 87 CM/S

## 2025-08-04 ENCOUNTER — TELEPHONE (OUTPATIENT)
Facility: CLINIC | Age: 75
End: 2025-08-04
Payer: OTHER GOVERNMENT

## 2025-08-04 NOTE — TELEPHONE ENCOUNTER
Spoke w/ pt spouse. Appt scheduled for 8/14. Spouse questioned if referral was authorized. Spouse stated she will bring copy just incase. Attempted to call [facility] to confirm. Was put on hold for over an hour. Will try again.

## 2025-08-08 DIAGNOSIS — I73.9 PAD (PERIPHERAL ARTERY DISEASE): Primary | ICD-10-CM

## 2025-08-14 ENCOUNTER — OFFICE VISIT (OUTPATIENT)
Facility: CLINIC | Age: 75
End: 2025-08-14
Payer: OTHER GOVERNMENT

## 2025-08-14 VITALS
HEART RATE: 51 BPM | HEIGHT: 73 IN | DIASTOLIC BLOOD PRESSURE: 93 MMHG | WEIGHT: 147.25 LBS | SYSTOLIC BLOOD PRESSURE: 169 MMHG | BODY MASS INDEX: 19.51 KG/M2

## 2025-08-14 DIAGNOSIS — I73.9 PAD (PERIPHERAL ARTERY DISEASE): Primary | ICD-10-CM

## 2025-08-14 PROCEDURE — 99999 PR PBB SHADOW E&M-EST. PATIENT-LVL II: CPT | Mod: PBBFAC,,, | Performed by: STUDENT IN AN ORGANIZED HEALTH CARE EDUCATION/TRAINING PROGRAM

## 2025-08-14 PROCEDURE — 99212 OFFICE O/P EST SF 10 MIN: CPT | Mod: PBBFAC,PO | Performed by: STUDENT IN AN ORGANIZED HEALTH CARE EDUCATION/TRAINING PROGRAM

## 2025-08-25 ENCOUNTER — TELEPHONE (OUTPATIENT)
Dept: PODIATRY | Facility: CLINIC | Age: 75
End: 2025-08-25
Payer: OTHER GOVERNMENT

## 2025-08-26 ENCOUNTER — TELEPHONE (OUTPATIENT)
Dept: PODIATRY | Facility: CLINIC | Age: 75
End: 2025-08-26
Payer: OTHER GOVERNMENT